# Patient Record
Sex: MALE | Race: WHITE | URBAN - METROPOLITAN AREA
[De-identification: names, ages, dates, MRNs, and addresses within clinical notes are randomized per-mention and may not be internally consistent; named-entity substitution may affect disease eponyms.]

---

## 2017-01-06 ENCOUNTER — OFFICE VISIT (OUTPATIENT)
Dept: FAMILY MEDICINE | Facility: CLINIC | Age: 32
End: 2017-01-06
Payer: COMMERCIAL

## 2017-01-06 VITALS
HEIGHT: 75 IN | HEART RATE: 89 BPM | TEMPERATURE: 98.5 F | BODY MASS INDEX: 23.26 KG/M2 | SYSTOLIC BLOOD PRESSURE: 122 MMHG | DIASTOLIC BLOOD PRESSURE: 71 MMHG | WEIGHT: 187.1 LBS | OXYGEN SATURATION: 98 %

## 2017-01-06 DIAGNOSIS — Z23 NEED FOR PROPHYLACTIC VACCINATION AND INOCULATION AGAINST INFLUENZA: Primary | ICD-10-CM

## 2017-01-06 DIAGNOSIS — F33.0 MAJOR DEPRESSIVE DISORDER, RECURRENT EPISODE, MILD (H): ICD-10-CM

## 2017-01-06 DIAGNOSIS — F51.01 PRIMARY INSOMNIA: ICD-10-CM

## 2017-01-06 PROCEDURE — 90471 IMMUNIZATION ADMIN: CPT | Performed by: FAMILY MEDICINE

## 2017-01-06 PROCEDURE — 90686 IIV4 VACC NO PRSV 0.5 ML IM: CPT | Performed by: FAMILY MEDICINE

## 2017-01-06 PROCEDURE — 99214 OFFICE O/P EST MOD 30 MIN: CPT | Mod: 25 | Performed by: FAMILY MEDICINE

## 2017-01-06 RX ORDER — LORAZEPAM 1 MG/1
0.5 TABLET ORAL 2 TIMES DAILY PRN
Qty: 20 TABLET | Refills: 1 | Status: SHIPPED | OUTPATIENT
Start: 2017-01-06 | End: 2019-02-01

## 2017-01-06 RX ORDER — ESCITALOPRAM OXALATE 10 MG/1
10 TABLET ORAL DAILY
Qty: 30 TABLET | Refills: 3
Start: 2017-01-06 | End: 2017-02-10

## 2017-01-06 RX ORDER — TRAZODONE HYDROCHLORIDE 50 MG/1
50 TABLET, FILM COATED ORAL
Qty: 30 TABLET | Refills: 3 | Status: SHIPPED | OUTPATIENT
Start: 2017-01-06 | End: 2017-05-26

## 2017-01-06 RX ORDER — PROPRANOLOL HYDROCHLORIDE 20 MG/1
20 TABLET ORAL 2 TIMES DAILY
Qty: 60 TABLET | Refills: 1 | Status: SHIPPED | OUTPATIENT
Start: 2017-01-06 | End: 2019-02-01

## 2017-01-06 ASSESSMENT — ANXIETY QUESTIONNAIRES
2. NOT BEING ABLE TO STOP OR CONTROL WORRYING: NEARLY EVERY DAY
GAD7 TOTAL SCORE: 15
7. FEELING AFRAID AS IF SOMETHING AWFUL MIGHT HAPPEN: MORE THAN HALF THE DAYS
IF YOU CHECKED OFF ANY PROBLEMS ON THIS QUESTIONNAIRE, HOW DIFFICULT HAVE THESE PROBLEMS MADE IT FOR YOU TO DO YOUR WORK, TAKE CARE OF THINGS AT HOME, OR GET ALONG WITH OTHER PEOPLE: EXTREMELY DIFFICULT
6. BECOMING EASILY ANNOYED OR IRRITABLE: NEARLY EVERY DAY
5. BEING SO RESTLESS THAT IT IS HARD TO SIT STILL: NOT AT ALL
3. WORRYING TOO MUCH ABOUT DIFFERENT THINGS: NEARLY EVERY DAY
1. FEELING NERVOUS, ANXIOUS, OR ON EDGE: NEARLY EVERY DAY

## 2017-01-06 ASSESSMENT — PATIENT HEALTH QUESTIONNAIRE - PHQ9: 5. POOR APPETITE OR OVEREATING: SEVERAL DAYS

## 2017-01-06 NOTE — PROGRESS NOTES
"  SUBJECTIVE:                                                    Nora Ayala is a 31 year old male who presents to clinic today for the following health issues:      Medication Followup of Trazodone, Lexapro, Inderal    Taking Medication as prescribed: NO-have not been taking    Side Effects:  None    Medication Helping Symptoms:  Have not been taking       Chief Complaint   Patient presents with     Recheck Medication    he's here today with worsening anxiety and depression. He has had this for over a year and has been working on talk therapy and other tools. Has not found them helpful.  PHQ-9 SCORE 7/1/2011 3/19/2012 3/10/2015   Total Score 5 4 23     Today his pH to score is quite high however he has no warning signs. He admits to some in frequent and fleeting thoughts about self-harm but never has any plans or intrusive thoughts or conviction. He has a loving wife and partner who has been helping him through this he really also feels that his therapist has been helpful. Overall he thinks that he is just not been able to manage things on his own. He does have the Lexapro from our last visit a year ago and wonders if he can just start this. No concerns with this and advised that he could do that. He has run out of lorazepam and had been using this sparingly about 1 or 2 times per month for anxiety. And he still has propranolol for presentations. He uses the trazodone sparingly and still has medications left over of this but would like refills.      Problem list and histories reviewed & adjusted, as indicated.  Additional history:     Problem list, Medication list, Allergies, and Medical/Social/Surgical histories reviewed in EPIC and updated as appropriate.    ROS:  Constitutional, HEENT, cardiovascular, pulmonary, gi and gu systems are negative, except as otherwise noted.    OBJECTIVE:                                                    /71 mmHg  Pulse 89  Temp(Src) 98.5  F (36.9  C) (Oral)  Ht 6' 3\" " (1.905 m)  Wt 187 lb 1.6 oz (84.868 kg)  BMI 23.39 kg/m2  SpO2 98%  Body mass index is 23.39 kg/(m^2).  GENERAL APPEARANCE: healthy, alert and no distress  PSYCH: mentation appears normal and affect flat         ASSESSMENT/PLAN:                                                    1. Need for prophylactic vaccination and inoculation against influenza    - FLU VAC, SPLIT VIRUS IM > 3 YO (QUADRIVALENT) [00464]  - Vaccine Administration, Initial [35830]    2. Major depressive disorder, recurrent episode, mild (H)  We had a long discussion about medications how they operate how they help and what to expect from them. Reviewed that a lot of his symptoms should shift from daily symptoms to intermittent symptoms and will have to see if a 10 mg dose or higher is better for him. Would like to see how he's doing in 1 month. Encouraged him to continue with talk therapy and review that a lot of times medicines can help remove barriers to making changes in his therapy might really become more efficient. Discussed in frequent use of Ativan and he has not needed referrals in the last year. And reviewed the use of propranolol for meetings and presentations. If he has any worsening symptoms such as worsening thoughts of self-harm or any intrusive thoughts encouraged him to let us know and we also reviewed the importance of behavioral health at [place] which she is familiar with.  - LORazepam (ATIVAN) 1 MG tablet; Take 0.5 tablets (0.5 mg) by mouth 2 times daily as needed for anxiety (or panic)  Dispense: 20 tablet; Refill: 1  - propranolol (INDERAL) 20 MG tablet; Take 1 tablet (20 mg) by mouth 2 times daily For performance anxiety at work  Dispense: 60 tablet; Refill: 1  - escitalopram (LEXAPRO) 10 MG tablet; Take 1 tablet (10 mg) by mouth daily  Dispense: 30 tablet; Refill: 3    3. Primary insomnia    - traZODone (DESYREL) 50 MG tablet; Take 1 tablet (50 mg) by mouth nightly as needed for sleep  Dispense: 30 tablet; Refill:  3      Follow up with Provider - would like to have him follow-up with a telephone visit in 1 month to see things are going or can be seen in person     Mabel Wiggins MD  [facility]    Injectable Influenza Immunization Documentation    1.  Is the person to be vaccinated sick today?  No    2. Does the person to be vaccinated have an allergy to eggs or to a component of the vaccine?  No    3. Has the person to be vaccinated today ever had a serious reaction to influenza vaccine in the past?  No    4. Has the person to be vaccinated ever had Guillain-Hayward syndrome?  No     Form completed by patient    OMID. TAYLOR Garcia January 6, 2017 4:27 PM

## 2017-01-06 NOTE — NURSING NOTE
"Chief Complaint   Patient presents with     Recheck Medication       Initial /71 mmHg  Pulse 89  Temp(Src) 98.5  F (36.9  C) (Oral)  Ht 6' 3\" (1.905 m)  Wt 187 lb 1.6 oz (84.868 kg)  BMI 23.39 kg/m2  SpO2 98% Estimated body mass index is 23.39 kg/(m^2) as calculated from the following:    Height as of this encounter: 6' 3\" (1.905 m).    Weight as of this encounter: 187 lb 1.6 oz (84.868 kg).  BP completed using cuff size: large(ANGELA)    DENIS Garcia MA January 6, 2017 9:45 AM      "

## 2017-01-07 ASSESSMENT — ANXIETY QUESTIONNAIRES: GAD7 TOTAL SCORE: 15

## 2017-01-07 ASSESSMENT — PATIENT HEALTH QUESTIONNAIRE - PHQ9: SUM OF ALL RESPONSES TO PHQ QUESTIONS 1-9: 25

## 2017-01-10 ENCOUNTER — TELEPHONE (OUTPATIENT)
Dept: FAMILY MEDICINE | Facility: CLINIC | Age: 32
End: 2017-01-10

## 2017-01-10 ENCOUNTER — APPOINTMENT (OUTPATIENT)
Dept: GENERAL RADIOLOGY | Facility: CLINIC | Age: 32
End: 2017-01-10
Attending: EMERGENCY MEDICINE
Payer: COMMERCIAL

## 2017-01-10 ENCOUNTER — HOSPITAL ENCOUNTER (EMERGENCY)
Facility: CLINIC | Age: 32
Discharge: HOME | End: 2017-01-10
Attending: EMERGENCY MEDICINE | Admitting: EMERGENCY MEDICINE
Payer: COMMERCIAL

## 2017-01-10 VITALS
SYSTOLIC BLOOD PRESSURE: 135 MMHG | HEIGHT: 75 IN | DIASTOLIC BLOOD PRESSURE: 80 MMHG | HEART RATE: 61 BPM | WEIGHT: 187 LBS | BODY MASS INDEX: 23.25 KG/M2 | OXYGEN SATURATION: 99 % | TEMPERATURE: 97.9 F

## 2017-01-10 DIAGNOSIS — R13.10 DYSPHAGIA, UNSPECIFIED TYPE: ICD-10-CM

## 2017-01-10 PROCEDURE — 71020 XR CHEST 2 VW: CPT

## 2017-01-10 PROCEDURE — 25000132 ZZH RX MED GY IP 250 OP 250 PS 637: Performed by: EMERGENCY MEDICINE

## 2017-01-10 PROCEDURE — 25000125 ZZHC RX 250: Performed by: EMERGENCY MEDICINE

## 2017-01-10 PROCEDURE — 99283 EMERGENCY DEPT VISIT LOW MDM: CPT | Mod: 25

## 2017-01-10 RX ADMIN — LIDOCAINE HYDROCHLORIDE 30 ML: 20 SOLUTION ORAL; TOPICAL at 11:21

## 2017-01-10 ASSESSMENT — ENCOUNTER SYMPTOMS
VOMITING: 0
TROUBLE SWALLOWING: 1

## 2017-01-10 NOTE — TELEPHONE ENCOUNTER
Spoke with patient.  States he feels like he has a chicken bone stuck in his throat.  When I swallow I can feel it and it hurts.   Denies any problems breathing.    Patient advised to go to ER ([facility] with SN).  Patient verbalizes understanding.  Olesya Banerjee RN

## 2017-01-10 NOTE — ED AVS SNAPSHOT
Emergency Department    [address] [place] [address]    Phone:  [phone]    Fax:  [phone]                                       Nora Ayala   MRN: [id number]    Department:   Emergency Department   Date of Visit:  1/10/2017           After Visit Summary Signature Page     I have received my discharge instructions, and my questions have been answered. I have discussed any challenges I see with this plan with the nurse or doctor.    ..........................................................................................................................................  Patient/Patient Representative Signature      ..........................................................................................................................................  Patient Representative Print Name and Relationship to Patient    ..................................................               ................................................  Date                                            Time    ..........................................................................................................................................  Reviewed by Signature/Title    ...................................................              ..............................................  Date                                                            Time

## 2017-01-10 NOTE — DISCHARGE INSTRUCTIONS
Esophageal Blockage, Resolved  The esophagus is the passage that carries food from the mouth to the stomach. You had a blockage in the esophagus. This can happen after swallowing a large piece of food, taking a large pill, or swallowing foreign objects.  If this is a recurring problem, it can be a sign of disease in the esophagus, such as inflammation (swelling and irritation) or scarring. If you did not have a special procedure (endoscopy) today to treat your condition, further testing will be needed to evaluate this problem.  The blockage has cleared. You should be able to swallow normally again.  Home care    For the next 24 hours you may drink liquids and eat soft foods.    You may have been given medicine today to prevent pain and help you relax. If so, you may feel drowsy for the next 4 to 12 hours. Do not drive or operate dangerous equipment until you feel alert again.    If your esophagus was blocked by food, be sure to cut solid food into small pieces before putting it into your mouth. Chew all foods well before swallowing.    If your esophagus was blocked by an over-the-counter pill (such as a vitamin), avoid this size pill in the future. If it was blocked by a prescription medicine, ask your health care provider for another form of medicine.  Follow-up care  Follow up with your health care provider, or as advised. If you continue to have problems, contact your doctor or this facility for advice. If this is a recurring problem, talk to your health care provider about it. He or she may suggest having an endoscopy. This is a look in the esophagus with a small camera and light in a narrow, flexible tube).  When to seek medical advice  Call your health care provider right away if any of these occur:    Chest pain or shortness of breath    Unable to swallow    Significant pain on swallowing    Vomiting blood (red or black)    Blood in your stool (dark red or black color)    Fever of 100.4 F (38 C) or higher,  or as directed by your health care provider    6297-1777 [employer]. [address] [place] [address], [place] [address]. All rights reserved. This information is not intended as a substitute for professional medical care. Always follow your healthcare professional's instructions.

## 2017-01-10 NOTE — ED PROVIDER NOTES
"  History     Chief Complaint:  Dysphagia      HPI   Nora Ayala is a 31 year old male with a history of anxiety who presents with concerns for dysphagia. The patient reports eating chicken for dinner last night and did note swallowing a hard piece of chicken. After dinner the patient noticed a mid sternal discomfort with swallowing. The patient woke this morning with ongoing discomfort. He is able to handle his secretions but water \"catches a little bit.\" He has not tried eating any solid food. He was referred here by his PCP. The patient does not report any vomiting other symptoms.     Allergies:  Amoxicillin  Ciclopirox       Medications:  LORazepam (ATIVAN) 1 MG tablet  traZODone (DESYREL) 50 MG tablet  propranolol (INDERAL) 20 MG tablet  escitalopram (LEXAPRO) 10 MG tablet    Past Medical History:    Intermittent asthma  Anxiety   Panic attacks    Mild major depression   External hemorrhoids     Past Surgical History:    No past surgical history on file.    Family History:    Mother - depression, thyroid disease, cancer   Father - migraines  Grandmother - liver cancer     Social History:  Marital Status:  Single   Smoking status: Never smoker, passive smoke exposure   Alcohol use: Yes, socially       Review of Systems   HENT: Positive for trouble swallowing.    Gastrointestinal: Negative for vomiting.   All other systems reviewed and are negative.      Physical Exam     Patient Vitals for the past 24 hrs:   BP Temp Temp src Pulse SpO2 Height Weight   01/10/17 1236 135/80 mmHg - - 61 99 % - -   01/10/17 1113 146/81 mmHg 97.9  F (36.6  C) Oral 58 97 % 1.905 m (6' 3\") 84.823 kg (187 lb)           Physical Exam  Constitutional: The patient is oriented to person, place, and time.   HENT:   Head: Atraumatic  Right Ear: Normal  Left Ear: Normal  Nose: Nose normal.   Mouth/Throat: Oropharynx is clear and moist. No erythema or exudate.   Eyes: Conjunctivae and EOM are normal. Pupils are equal, round, and reactive to " light. No discharge  Neck: Normal range of motion. Neck supple.   Cardiovascular: Normal rate, regular rhythm, no murmur gallops or rubs. Intact distal pulses.    Pulmonary/Chest: CTA bilaterally. No wheezes rale or rhonchi.  Abdominal: Soft. Non tender.  No masses   Musculoskeletal: No edema. No bony deformity. Normal range of motion  Lymphadenopathy:     The patient has no cervical adenopathy.   Neurological: The patient is alert and oriented to person, place, and time. The patient has normal strength and normal reflexes. No cranial nerve deficit. Coordination normal.   Skin: Skin is warm and dry. No rash noted. The patient is not diaphoretic.   Psychiatric: The patient has a normal mood and affect.    Emergency Department Course     Imaging:  Radiographic findings were communicated with the patient who voiced understanding of the findings.    XR Chest PA & LAT  IMPRESSION:  No visualized chicken bones. Lungs are clear. No acute  process identified.   Preliminary radiology read.       Interventions:  (1121) GI Cocktail (Maalox/Mylanta and viscous Lidocaine), 15 mL suspension, PO      Emergency Department Course:  Nursing notes and vitals reviewed.  (1112) I performed an exam of the patient as documented above.    The patient was sent for a chest xray while in the emergency department, findings above.      (1230) Patient update. Findings and plan explained to the patient. Patient discharged home with instructions regarding supportive care, medications, and reasons to return. The importance of close follow-up was reviewed.     Impression & Plan      Medical Decision Making:  Patient presents with pain with swallowing and thinks he may have a chicken bone stuck lower in his esophagus, he estimates in the mid sternal region. He is having no evidence for respiratory compromise. He is able to manage his own secretions. Chest xray does not show obvious bony foreign body. Given this I feel he can be safely discharged to  home. This may be simple esophogeal abrasion. I have recommended liquid antacid. He can try eating some soft bread to try to move anything down. If he has persistent symptoms he may require GI follow up for upper endoscopy.       Diagnosis:    ICD-10-CM   1. Dysphagia, unspecified type R13.10       Disposition:  Patient is discharged to home.              Garth Brown  1/10/2017    EMERGENCY DEPARTMENT    IGarth, am serving as a scribe on 1/10/2017 at 11:12 AM to personally document services performed by Dr. Avila based on my observations and the provider's statements to me.      Juni Avila MD  01/10/17 7435

## 2017-01-10 NOTE — TELEPHONE ENCOUNTER
Reason for Call:  Other call back    Detailed comments:  Sonia saldaña call and stated that they went out to dinner  Last leo and her  thinks that he has a chicken done stuck in his throat.   He is able to breath. Just wants to know if they should come in or will it work it's way down    Phone Number Patient can be reached at: Home number on file [phone] (home)    Best Time: anytime    Can we leave a detailed message on this number? YES    Call taken on 1/10/2017 at 9:23 AM by Celia Maxwell

## 2017-01-10 NOTE — ED AVS SNAPSHOT
Emergency Department    [address] [facility] [address]    Phone:  [phone]    Fax:  [phone]                                       Nora Ayala   MRN: [id number]    Department:   Emergency Department   Date of Visit:  1/10/2017           Patient Information     Date Of Birth          1985        Your diagnoses for this visit were:     Dysphagia, unspecified type        You were seen by Juni Avila MD.      Follow-up Information     Follow up with Mabel Wiggins MD.    Specialty:  Family Practice    Contact information:    [facility]  [address]  [facility] [address] [phone]          Follow up with Maci Self MD.    Specialty:  Gastroenterology    Why:  As needed    Contact information:    [facility]  [address] [place] [address] [phone]          Discharge Instructions         Esophageal Blockage, Resolved  The esophagus is the passage that carries food from the mouth to the stomach. You had a blockage in the esophagus. This can happen after swallowing a large piece of food, taking a large pill, or swallowing foreign objects.  If this is a recurring problem, it can be a sign of disease in the esophagus, such as inflammation (swelling and irritation) or scarring. If you did not have a special procedure (endoscopy) today to treat your condition, further testing will be needed to evaluate this problem.  The blockage has cleared. You should be able to swallow normally again.  Home care    For the next 24 hours you may drink liquids and eat soft foods.    You may have been given medicine today to prevent pain and help you relax. If so, you may feel drowsy for the next 4 to 12 hours. Do not drive or operate dangerous equipment until you feel alert again.    If your esophagus was blocked by food, be sure to cut solid food into small pieces before putting it into your mouth. Chew all foods well before  swallowing.    If your esophagus was blocked by an over-the-counter pill (such as a vitamin), avoid this size pill in the future. If it was blocked by a prescription medicine, ask your health care provider for another form of medicine.  Follow-up care  Follow up with your health care provider, or as advised. If you continue to have problems, contact your doctor or this facility for advice. If this is a recurring problem, talk to your health care provider about it. He or she may suggest having an endoscopy. This is a look in the esophagus with a small camera and light in a narrow, flexible tube).  When to seek medical advice  Call your health care provider right away if any of these occur:    Chest pain or shortness of breath    Unable to swallow    Significant pain on swallowing    Vomiting blood (red or black)    Blood in your stool (dark red or black color)    Fever of 100.4 F (38 C) or higher, or as directed by your health care provider    4579-1891 [employer]. [address] [place] [address]. All rights reserved. This information is not intended as a substitute for professional medical care. Always follow your healthcare professional's instructions.          Future Appointments        Provider Department Dept Phone Center    1/13/2017 3:30 PM Mabel Wiggins MD [facility] [phone] UP      24 Hour Appointment Hotline       To make an appointment at any [facility], call [phone] ([phone]). If you don't have a family doctor or clinic, we will help you find one. [facility] are conveniently located to serve the needs of you and your family.             Review of your medicines      Our records show that you are taking the medicines listed below. If these are incorrect, please call your family doctor or clinic.        Dose / Directions Last dose taken    BOOSTRIX 5-2.5-18.5 LF-MCG/0.5 injection   Generic drug:  Tdap (tetanus-diptheria-acell  pertussis)        ADM 0.5ML IM UTD   Refills:  0        escitalopram 10 MG tablet   Commonly known as:  LEXAPRO   Dose:  10 mg   Quantity:  30 tablet        Take 1 tablet (10 mg) by mouth daily   Refills:  3        LORazepam 1 MG tablet   Commonly known as:  ATIVAN   Dose:  0.5 mg   Quantity:  20 tablet        Take 0.5 tablets (0.5 mg) by mouth 2 times daily as needed for anxiety (or panic)   Refills:  1        propranolol 20 MG tablet   Commonly known as:  INDERAL   Dose:  20 mg   Quantity:  60 tablet        Take 1 tablet (20 mg) by mouth 2 times daily For performance anxiety at work   Refills:  1        traZODone 50 MG tablet   Commonly known as:  DESYREL   Dose:  50 mg   Quantity:  30 tablet        Take 1 tablet (50 mg) by mouth nightly as needed for sleep   Refills:  3                Procedures and tests performed during your visit     Chest XR,  PA & LAT      Orders Needing Specimen Collection     None      Pending Results     No orders found from 1/9/2017 to 1/11/2017.            Pending Culture Results     No orders found from 1/9/2017 to 1/11/2017.       Test Results from your hospital stay           1/10/2017 12:30 PM - Interface, Radiant Ib      Narrative     CHEST TWO VIEWS  1/10/2017 12:22 PM    HISTORY:  Question chicken bone in esophagus.    COMPARISON:  None.        Impression     IMPRESSION:  No visualized chicken bones. Lungs are clear. No acute  process identified.     SHEYLA ALVARADO MD                Clinical Quality Measure: Blood Pressure Screening     Your blood pressure was checked while you were in the emergency department today. The last reading we obtained was  BP: 146/81 mmHg . Please read the guidelines below about what these numbers mean and what you should do about them.  If your systolic blood pressure (the top number) is less than 120 and your diastolic blood pressure (the bottom number) is less than 80, then your blood pressure is normal. There is nothing more that you need to do  "about it.  If your systolic blood pressure (the top number) is 120-139 or your diastolic blood pressure (the bottom number) is 80-89, your blood pressure may be higher than it should be. You should have your blood pressure rechecked within a year by a primary care provider.  If your systolic blood pressure (the top number) is 140 or greater or your diastolic blood pressure (the bottom number) is 90 or greater, you may have high blood pressure. High blood pressure is treatable, but if left untreated over time it can put you at risk for heart attack, stroke, or kidney failure. You should have your blood pressure rechecked by a primary care provider within the next 4 weeks.  If your provider in the emergency department today gave you specific instructions to follow-up with your doctor or provider even sooner than that, you should follow that instruction and not wait for up to 4 weeks for your follow-up visit.        Thank you for choosing [facility]       Thank you for choosing [facility] for your care. Our goal is always to provide you with excellent care. Hearing back from our patients is one way we can continue to improve our services. Please take a few minutes to complete the written survey that you may receive in the mail after you visit with us. Thank you!        [email or web address] Information     [employer] lets you send messages to your doctor, view your test results, renew your prescriptions, schedule appointments and more. To sign up, go to [email or web address] . Click on \"Log in\" on the left side of the screen, which will take you to the Welcome page. Then click on \"Sign up Now\" on the right side of the page.     You will be asked to enter the access code listed below, as well as some personal information. Please follow the directions to create your username and password.     Your access code is: [email or web address]  Expires: 4/10/2017 11:35 AM     Your access code will  in 90 days. If you need help or a new code, please " call your [facility] clinic or [phone].        Care EveryWhere ID     This is your Care EveryWhere ID. This could be used by other organizations to access your [facility] medical records  [id number]        After Visit Summary       This is your record. Keep this with you and show to your community pharmacist(s) and doctor(s) at your next visit.

## 2017-01-11 ENCOUNTER — TELEPHONE (OUTPATIENT)
Dept: FAMILY MEDICINE | Facility: CLINIC | Age: 32
End: 2017-01-11

## 2017-01-11 NOTE — TELEPHONE ENCOUNTER
Patient states he was not aware he had ER f/u appt for 1/13.  States he doesn't feel he needs to f/u.    Reviewed d/c summary with pt:    Impression & Plan       Medical Decision Making:  Patient presents with pain with swallowing and thinks he may have a chicken bone stuck lower in his esophagus, he estimates in the mid sternal region. He is having no evidence for respiratory compromise. He is able to manage his own secretions. Chest xray does not show obvious bony foreign body. Given this I feel he can be safely discharged to home. This may be simple esophogeal abrasion. I have recommended liquid antacid. He can try eating some soft bread to try to move anything down. If he has persistent symptoms he may require GI follow up for upper endoscopy.       Diagnosis:      ICD-10-CM    1.  Dysphagia, unspecified type  R13.10        Disposition:  Patient is discharged to home.            He will continue to monitor and f/u if needed.  Cancelled appt  Ibis KING RN

## 2017-01-11 NOTE — TELEPHONE ENCOUNTER
Reason for Call:  Other call back    Detailed comments: pt did not make the appt for Friday the 13th. And  Does not know why they have to come. Pt would like a call back for  More information abaout it.    Phone Number Patient can be reached at: Home number on file [phone] (home)    Best Time: anyitme    Can we leave a detailed message on this number? YES    Call taken on 1/11/2017 at 1:25 PM by Hilary Beckett

## 2017-02-10 ENCOUNTER — TELEPHONE (OUTPATIENT)
Dept: FAMILY MEDICINE | Facility: CLINIC | Age: 32
End: 2017-02-10

## 2017-02-10 DIAGNOSIS — F33.0 MAJOR DEPRESSIVE DISORDER, RECURRENT EPISODE, MILD (H): Primary | ICD-10-CM

## 2017-02-10 RX ORDER — ESCITALOPRAM OXALATE 10 MG/1
10 TABLET ORAL DAILY
Qty: 30 TABLET | Refills: 0 | Status: SHIPPED | OUTPATIENT
Start: 2017-02-10 | End: 2017-02-17

## 2017-02-10 NOTE — TELEPHONE ENCOUNTER
Pt saw SN 1/6/2017, noted:  Major depressive disorder, recurrent episode, mild (H)  Reviewed that a lot of his symptoms should shift from daily symptoms to   intermittent symptoms and will have to see if a 10 mg dose or higher is better for him. Would   like to see how he's doing in 1 month. Encouraged him to continue with talk therapy and   review that a lot of times medicines can help remove barriers to making changes in his therapy   might really become more efficient.     Patient states he has been taking 10mg daily for the past month; has been working well.  Last Rx sent 9/30/2015 #30 with 3 refills; has been on and off med per patient, but SN is aware of this and just restarted med at 1/6/2017 appt  Due for f/u; scheduled with MATTHEW.  Medication is being filled for 1 time refill only due to:  upcoming appt   Patient informed Rx sent.  Ibis KING RN

## 2017-02-10 NOTE — TELEPHONE ENCOUNTER
Reason for Call:  Medication or medication refill:    Do you use a [facility]?  Name of the pharmacy and phone number for the current request:       [facility] [address] [facility] [address] AT [facility] [address]      Name of the medication requested: escitalopram (LEXAPRO) 10 MG tablet    Other request:     Can we leave a detailed message on this number? YES    Phone number patient can be reached at: Home number on file [phone] (home)    Best Time:     Call taken on 2/10/2017 at 10:11 AM by Mary Mcgrath

## 2017-02-17 ENCOUNTER — OFFICE VISIT (OUTPATIENT)
Dept: FAMILY MEDICINE | Facility: CLINIC | Age: 32
End: 2017-02-17
Payer: COMMERCIAL

## 2017-02-17 VITALS
BODY MASS INDEX: 22.5 KG/M2 | WEIGHT: 181 LBS | OXYGEN SATURATION: 97 % | TEMPERATURE: 98.2 F | RESPIRATION RATE: 16 BRPM | DIASTOLIC BLOOD PRESSURE: 80 MMHG | HEIGHT: 75 IN | SYSTOLIC BLOOD PRESSURE: 120 MMHG | HEART RATE: 83 BPM

## 2017-02-17 DIAGNOSIS — G47.09 OTHER INSOMNIA: ICD-10-CM

## 2017-02-17 DIAGNOSIS — F33.0 MAJOR DEPRESSIVE DISORDER, RECURRENT EPISODE, MILD (H): Primary | ICD-10-CM

## 2017-02-17 PROCEDURE — 99213 OFFICE O/P EST LOW 20 MIN: CPT | Performed by: FAMILY MEDICINE

## 2017-02-17 RX ORDER — ESCITALOPRAM OXALATE 20 MG/1
20 TABLET ORAL DAILY
Qty: 30 TABLET | Refills: 6 | Status: SHIPPED | OUTPATIENT
Start: 2017-02-17 | End: 2017-09-29

## 2017-02-17 NOTE — PROGRESS NOTES
SUBJECTIVE:                                                    Nora Ayala is a 30 year old male who presents to clinic today for the following health issues:    Abnormal Mood Symptoms      Duration: On going issue roughly 2 years    Description:  Depression: YES  Anxiety: YES  Panic attacks: YES  4-5    Accompanying signs and symptoms: see PHQ-9 and NADIA scores    History (similar episodes/previous evaluation):  Patients mother passed away    Precipitating or alleviating factors: None    Therapies tried and outcome: Celexa (Citalopram)  Not effective. exercising short acting relief, and reading      Panic attacks due to work  Difficulties with self esteem at work  Was seeing a therapist in the past insurance did not cover - too expensive  He is now on changing jobs which he thinks will be helpful    Recently restarted Lexapro 10mg     lorazepam , hasn't been using very much    Taking trazodone, pretty much every night for sleep    Problem list and histories reviewed & adjusted, as indicated.  Additional history: as documented    Patient Active Problem List   Diagnosis     CARDIOVASCULAR SCREENING; LDL GOAL LESS THAN 160     Mild major depression (H)     Insomnia     Family history of cancer of digestive organ     External hemorrhoids     Panic attack     No past surgical history on file.    Social History   Substance Use Topics     Smoking status: Passive Smoke Exposure - Never Smoker     Smokeless tobacco: Never Used     Alcohol use 0.0 oz/week     0 Standard drinks or equivalent per week      Comment: occa- social      Family History   Problem Relation Age of Onset     Depression Mother      Gynecology Mother      Neurologic Disorder Father      MIGRAINE     Thyroid Disease Mother      CANCER Maternal Grandmother      LIVER CANCER     Allergies       SELF- CICLOR, AMOXICILLIN     Depression       SELF     Eye Disorder       SELF     Neurologic Disorder       SELF     Respiratory       SELF     CANCER Mother 59  "        Current Outpatient Prescriptions   Medication Sig Dispense Refill     escitalopram (LEXAPRO) 10 MG tablet Take 1 tablet (10 mg) by mouth daily 30 tablet 0     BOOSTRIX 5-2.5-18.5 injection ADM 0.5ML IM UTD  0     LORazepam (ATIVAN) 1 MG tablet Take 0.5 tablets (0.5 mg) by mouth 2 times daily as needed for anxiety (or panic) 20 tablet 1     traZODone (DESYREL) 50 MG tablet Take 1 tablet (50 mg) by mouth nightly as needed for sleep 30 tablet 3     propranolol (INDERAL) 20 MG tablet Take 1 tablet (20 mg) by mouth 2 times daily For performance anxiety at work 60 tablet 1     Labs reviewed in EPIC  Problem list, Medication list, Allergies, and Medical/Social/Surgical histories reviewed in [facility] and updated as appropriate.    ROS:  Constitutional, HEENT, cardiovascular, pulmonary, gi and gu systems are negative, except as otherwise noted.    OBJECTIVE:                                                    /80 (BP Location: Left arm, Cuff Size: Adult Regular)  Pulse 83  Temp 98.2  F (36.8  C) (Oral)  Resp 16  Ht 6' 3\" (1.905 m)  Wt 181 lb (82.1 kg)  SpO2 97%  BMI 22.62 kg/m2  Body mass index is 22.62 kg/(m^2).  GENERAL APPEARANCE: healthy, alert and no distress  PSYCH: mentation appears normal, patient appearance--good eye contact       ASSESSMENT/PLAN:                                                        ICD-10-CM    1. Major depressive disorder, single episode, mild (H) F32.0    2. Other insomnia G47.09    3. Major depressive disorder, recurrent episode, mild (H) F33.0 escitalopram (LEXAPRO) 20 MG tablet     Discussed potential for impairment and dependence with benzo medications  Patient is using appropriately  Increase citalopram to 20 mg daily    Follow-up in 4-6 weeks or as needed  Lorenzo Adhikari      [facility]          "

## 2017-02-17 NOTE — MR AVS SNAPSHOT
"              After Visit Summary   2/17/2017    Nora Ayala    MRN: [id number]           Patient Information     Date Of Birth          1985        Visit Information        Provider Department      2/17/2017 11:30 AM Lorenzo Adhikari MD [facility]        Today's Diagnoses     Major depressive disorder, recurrent episode, mild (H)    -  1    Other insomnia           Follow-ups after your visit        Follow-up notes from your care team     Return in about 2 months (around 4/17/2017).      Who to contact     If you have questions or need follow up information about today's clinic visit or your schedule please contact [facility] directly at [phone].  Normal or non-critical lab and imaging results will be communicated to you by MyChart, letter or phone within 4 business days after the clinic has received the results. If you do not hear from us within 7 days, please contact the clinic through Ruzukuhart or phone. If you have a critical or abnormal lab result, we will notify you by phone as soon as possible.  Submit refill requests through [employer]Vista or call your pharmacy and they will forward the refill request to us. Please allow 3 business days for your refill to be completed.          Additional Information About Your Visit        MyChart Information     OpVista gives you secure access to your electronic health record. If you see a primary care provider, you can also send messages to your care team and make appointments. If you have questions, please call your primary care clinic.  If you do not have a primary care provider, please call [phone] and they will assist you.        Care EveryWhere ID     This is your Care EveryWhere ID. This could be used by other organizations to access your [facility] medical records  [id number]        Your Vitals Were     Pulse Temperature Respirations Height Pulse Oximetry BMI (Body Mass Index)    83 98.2  F (36.8  C) (Oral) 16 6' 3\" (1.905 m) " 97% 22.62 kg/m2       Blood Pressure from Last 3 Encounters:   02/17/17 120/80   01/10/17 135/80   01/06/17 122/71    Weight from Last 3 Encounters:   02/17/17 181 lb (82.1 kg)   01/10/17 187 lb (84.8 kg)   01/06/17 187 lb 1.6 oz (84.9 kg)              Today, you had the following     No orders found for display         Today's Medication Changes          These changes are accurate as of: 2/17/17  1:45 PM.  If you have any questions, ask your nurse or doctor.               These medicines have changed or have updated prescriptions.        Dose/Directions    escitalopram 20 MG tablet   Commonly known as:  LEXAPRO   This may have changed:    - medication strength  - how much to take   Used for:  Major depressive disorder, recurrent episode, mild (H)   Changed by:  Lorenzo Adhikari MD        Dose:  20 mg   Take 1 tablet (20 mg) by mouth daily   Quantity:  30 tablet   Refills:  6            Where to get your medicines      These medications were sent to [facility] [address] [place] [address] [address] AT [facility] [address] [facility] [address]     Phone:  [phone]     escitalopram 20 MG tablet                Primary Care Provider Office Phone # Fax #    W[place]Monserrat Wiggins [phone]       [facility] [address] [address]        Thank you!     Thank you for choosing [facility]  for your care. Our goal is always to provide you with excellent care. Hearing back from our patients is one way we can continue to improve our services. Please take a few minutes to complete the written survey that you may receive in the mail after your visit with us. Thank you!             Your Updated Medication List - Protect others around you: Learn how to safely use, store and throw away your medicines at www.disposemymeds.org.          This list is accurate as of: 2/17/17  1:45 PM.  Always use your most recent med  list.                   Brand Name Dispense Instructions for use    BOOSTRIX 5-2.5-18.5 LF-MCG/0.5 injection   Generic drug:  Tdap (tetanus-diptheria-acell pertussis)      ADM 0.5ML IM UTD       escitalopram 20 MG tablet    LEXAPRO    30 tablet    Take 1 tablet (20 mg) by mouth daily       LORazepam 1 MG tablet    ATIVAN    20 tablet    Take 0.5 tablets (0.5 mg) by mouth 2 times daily as needed for anxiety (or panic)       propranolol 20 MG tablet    INDERAL    60 tablet    Take 1 tablet (20 mg) by mouth 2 times daily For performance anxiety at work       traZODone 50 MG tablet    DESYREL    30 tablet    Take 1 tablet (50 mg) by mouth nightly as needed for sleep

## 2017-02-17 NOTE — NURSING NOTE
"Chief Complaint   Patient presents with     Depression     initial /80 (BP Location: Left arm, Cuff Size: Adult Regular)  Pulse 83  Temp 98.2  F (36.8  C) (Oral)  Resp 16  Ht 6' 3\" (1.905 m)  Wt 181 lb (82.1 kg)  SpO2 97%  BMI 22.62 kg/m2 Estimated body mass index is 22.62 kg/(m^2) as calculated from the following:    Height as of this encounter: 6' 3\" (1.905 m).    Weight as of this encounter: 181 lb (82.1 kg).  BP completed using cuff size: regular.  L  arm      Health Maintenance that is potentially due pending provider review:  NONE    n/a    Emmett Milian ma  "

## 2017-02-18 ASSESSMENT — PATIENT HEALTH QUESTIONNAIRE - PHQ9: SUM OF ALL RESPONSES TO PHQ QUESTIONS 1-9: 16

## 2017-05-26 DIAGNOSIS — F51.01 PRIMARY INSOMNIA: ICD-10-CM

## 2017-05-26 RX ORDER — TRAZODONE HYDROCHLORIDE 50 MG/1
50 TABLET, FILM COATED ORAL
Qty: 30 TABLET | Refills: 0 | Status: SHIPPED | OUTPATIENT
Start: 2017-05-26 | End: 2017-06-14

## 2017-05-26 NOTE — TELEPHONE ENCOUNTER
Medication is being filled for 1 time refill only due to:  overdue for f/u appt   Patient saw JF 2/17/2017, noted to f/u in 4-6 weeks  Ibis KING RN

## 2017-05-26 NOTE — TELEPHONE ENCOUNTER
Pending Prescriptions:                       Disp   Refills    traZODone (DESYREL) 50 MG tablet          30 tab*3            Sig: Take 1 tablet (50 mg) by mouth nightly as needed           for sleep          Last Written Prescription Date: 0/06/2017  Last Fill Quantity: 30,  # refills: 3   Last Office Visit with [facility]RISTIANO, [facility] or [facility] prescribing provider: 02/17/2017

## 2017-06-14 ENCOUNTER — OFFICE VISIT (OUTPATIENT)
Dept: FAMILY MEDICINE | Facility: CLINIC | Age: 32
End: 2017-06-14
Payer: COMMERCIAL

## 2017-06-14 VITALS
TEMPERATURE: 98.3 F | BODY MASS INDEX: 22.88 KG/M2 | RESPIRATION RATE: 14 BRPM | OXYGEN SATURATION: 96 % | DIASTOLIC BLOOD PRESSURE: 74 MMHG | WEIGHT: 184 LBS | HEART RATE: 64 BPM | SYSTOLIC BLOOD PRESSURE: 110 MMHG | HEIGHT: 75 IN

## 2017-06-14 DIAGNOSIS — Z23 NEED FOR VACCINATION: ICD-10-CM

## 2017-06-14 DIAGNOSIS — F10.10 DYSFUNCTIONAL ALCOHOL USE: ICD-10-CM

## 2017-06-14 DIAGNOSIS — R13.10 DYSPHAGIA, UNSPECIFIED TYPE: ICD-10-CM

## 2017-06-14 DIAGNOSIS — F51.01 PRIMARY INSOMNIA: ICD-10-CM

## 2017-06-14 DIAGNOSIS — Z00.00 ROUTINE GENERAL MEDICAL EXAMINATION AT A HEALTH CARE FACILITY: Primary | ICD-10-CM

## 2017-06-14 DIAGNOSIS — F33.1 MODERATE RECURRENT MAJOR DEPRESSION (H): ICD-10-CM

## 2017-06-14 DIAGNOSIS — D22.9 NUMEROUS MOLES: ICD-10-CM

## 2017-06-14 PROCEDURE — 99213 OFFICE O/P EST LOW 20 MIN: CPT | Mod: 25 | Performed by: FAMILY MEDICINE

## 2017-06-14 PROCEDURE — 90471 IMMUNIZATION ADMIN: CPT | Performed by: FAMILY MEDICINE

## 2017-06-14 PROCEDURE — 80053 COMPREHEN METABOLIC PANEL: CPT | Performed by: FAMILY MEDICINE

## 2017-06-14 PROCEDURE — 36415 COLL VENOUS BLD VENIPUNCTURE: CPT | Performed by: FAMILY MEDICINE

## 2017-06-14 PROCEDURE — 99395 PREV VISIT EST AGE 18-39: CPT | Mod: 25 | Performed by: FAMILY MEDICINE

## 2017-06-14 PROCEDURE — 82977 ASSAY OF GGT: CPT | Performed by: FAMILY MEDICINE

## 2017-06-14 PROCEDURE — 90715 TDAP VACCINE 7 YRS/> IM: CPT | Performed by: FAMILY MEDICINE

## 2017-06-14 RX ORDER — TRAZODONE HYDROCHLORIDE 50 MG/1
50 TABLET, FILM COATED ORAL
Qty: 30 TABLET | Refills: 11 | Status: SHIPPED | OUTPATIENT
Start: 2017-06-14 | End: 2018-06-11

## 2017-06-14 NOTE — MR AVS SNAPSHOT
After Visit Summary   6/14/2017    Nora Ayala    MRN: [id number]           Patient Information     Date Of Birth          1985        Visit Information        Provider Department      6/14/2017 1:00 PM Farhana Maznanares MD [facility]        Today's Diagnoses     Routine general medical examination at a health care facility    -  1    Moderate recurrent major depression (H)        Primary insomnia        Dysfunctional alcohol use        Dysphagia, unspecified type        Numerous moles        Need for vaccination          Care Instructions      Preventive Health Recommendations  Male Ages 26 - 39    Yearly exam:             See your health care provider every year in order to  o   Review health changes.   o   Discuss preventive care.    o   Review your medicines if your doctor has prescribed any.    You should be tested each year for STDs (sexually transmitted diseases), if you re at risk.     After age 35, talk to your provider about cholesterol testing. If you are at risk for heart disease, have your cholesterol tested at least every 5 years.     If you are at risk for diabetes, you should have a diabetes test (fasting glucose).  Shots: Get a flu shot each year. Get a tetanus shot every 10 years.     Nutrition:    Eat at least 5 servings of fruits and vegetables daily.     Eat whole-grain bread, whole-wheat pasta and brown rice instead of white grains and rice.     Talk to your provider about Calcium and Vitamin D.     Lifestyle    Exercise for at least 150 minutes a week (30 minutes a day, 5 days a week). This will help you control your weight and prevent disease.     Limit alcohol to one drink per day.     No smoking.     Wear sunscreen to prevent skin cancer.     See your dentist every six months for an exam and cleaning.             Follow-ups after your visit        Additional Services     GASTROENTEROLOGY ADULT REF CONSULT ONLY       Preferred Location: [facility], [facility]  [phone], [facility], P: [phone] and [facility] [phone]      Please be aware that coverage of these services is subject to the terms and limitations of your health insurance plan.  Call member services at your health plan with any benefit or coverage questions.  Any procedures must be performed at a [facility] facility OR coordinated by your clinic's referral office.    Please bring the following with you to your appointment:    (1) Any X-Rays, CTs or MRIs which have been performed.  Contact the facility where they were done to arrange for  prior to your scheduled appointment.    (2) List of current medications   (3) This referral request   (4) Any documents/labs given to you for this referral                  Who to contact     If you have questions or need follow up information about today's clinic visit or your schedule please contact [facility] directly at [phone].  Normal or non-critical lab and imaging results will be communicated to you by NewComLinkhart, letter or phone within 4 business days after the clinic has received the results. If you do not hear from us within 7 days, please contact the clinic through NewComLinkhart or phone. If you have a critical or abnormal lab result, we will notify you by phone as soon as possible.  Submit refill requests through [employer] or call your pharmacy and they will forward the refill request to us. Please allow 3 business days for your refill to be completed.          Additional Information About Your Visit        [place] Information     [employer] gives you secure access to your electronic health record. If you see a primary care provider, you can also send messages to your care team and make appointments. If you have questions, please call your primary care clinic.  If you do not have a primary care provider, please call [phone] and they will assist you.        Care EveryWhere ID     This is your Care EveryWhere ID. This could be  "used by other organizations to access your [facility] medical records  [id number]        Your Vitals Were     Pulse Temperature Respirations Height Pulse Oximetry BMI (Body Mass Index)    64 98.3  F (36.8  C) (Oral) 14 6' 3\" (1.905 m) 96% 23 kg/m2       Blood Pressure from Last 3 Encounters:   06/14/17 110/74   02/17/17 120/80   01/10/17 135/80    Weight from Last 3 Encounters:   06/14/17 184 lb (83.5 kg)   02/17/17 181 lb (82.1 kg)   01/10/17 187 lb (84.8 kg)              We Performed the Following     Comprehensive metabolic panel     GASTROENTEROLOGY ADULT REF CONSULT ONLY     GGT     TDAP VACCINE (ADACEL)          Where to get your medicines      These medications were sent to [facility] [address] [place], [place] [address] AT [address] [address]    Hours:  24-hours Phone:  [phone]     traZODone 50 MG tablet          Primary Care Provider Office Phone # Fax #    MarionMonserrat Wiggins [phone]       [address] [address]        Thank you!     Thank you for choosing [facility]  for your care. Our goal is always to provide you with excellent care. Hearing back from our patients is one way we can continue to improve our services. Please take a few minutes to complete the written survey that you may receive in the mail after your visit with us. Thank you!             Your Updated Medication List - Protect others around you: Learn how to safely use, store and throw away your medicines at www.disposemymeds.org.          This list is accurate as of: 6/14/17  2:01 PM.  Always use your most recent med list.                   Brand Name Dispense Instructions for use    escitalopram 20 MG tablet    LEXAPRO    30 tablet    Take 1 tablet (20 mg) by mouth daily       LORazepam 1 MG tablet    ATIVAN    20 tablet    Take 0.5 tablets (0.5 mg) by mouth 2 times daily as needed " for anxiety (or panic)       propranolol 20 MG tablet    INDERAL    60 tablet    Take 1 tablet (20 mg) by mouth 2 times daily For performance anxiety at work       traZODone 50 MG tablet    DESYREL    30 tablet    Take 1 tablet (50 mg) by mouth nightly as needed for sleep

## 2017-06-14 NOTE — PROGRESS NOTES
SUBJECTIVE:     CC: Nora Ayala is an 31 year old male who presents for preventative health visit.     Healthy Habits:    Do you get at least three servings of calcium containing foods daily (dairy, green leafy vegetables, etc.)? yes    Amount of exercise or daily activities, outside of work: 3 day(s) per week    Problems taking medications regularly No    Medication side effects: Yes insomnia from lexapro-feels SOB within last yr-corresponds to starting medication    Have you had an eye exam in the past two years? no    Do you see a dentist twice per year? 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   Feeling down depressed often, feeling tired , restless, poor concentration  Poor sleep started since on lexapro  Trazodone help but does not want to rely on it for very long  Does not want medications changes for 2 months, till new job ok for a bit  Feels some improvement in mental health have occurred from  Life style changes, drinking alcohol less- average 20 drinks a week  PHQ-9 SCORE 2017   Total Score - - -   Total Score 25 16 15       Difficult swallowing food and sometimes water as well  Pills stick in the back of the throat  Carrot was stuck and had choking incident and lodged, was able to breath swallow, tried to swallow water and it came back and vomited 3 times, before was able to swallow water and it went down- this happened 2 weeks ago  He reports he was chewing well, was not in a hurry and now the food still sticks in the back of the thraot  Has good appetite, no weight loss  Mom just passed on at age 49 from  pancreatic cancer, and maternal  of cancer, not sure which one     PROBLEMS  "TO ADD ON...    Today's PHQ-2 Score:   PHQ-2 ( 1999 Pfizer) 6/14/2017 1/6/2017   Q1: Little interest or pleasure in doing things 0 3   Q2: Feeling down, depressed or hopeless 2 3   PHQ-2 Score 2 6       Abuse: Current or Past(Physical, Sexual or Emotional)- No  Do you feel safe in your environment - Yes    Social History   Substance Use Topics     Smoking status: Passive Smoke Exposure - Never Smoker     Smokeless tobacco: Never Used     Alcohol use 0.0 oz/week     0 Standard drinks or equivalent per week      Comment: occa- social      The patient does not drink >3 drinks per day nor >7 drinks per week.    Last PSA: No results found for: PSA    No results for input(s): CHOL, HDL, LDL, TRIG, CHOLHDLRATIO, NHDL in the last 55161 hours.    Reviewed orders with patient. Reviewed health maintenance and updated orders accordingly - Yes    Reviewed and updated as needed this visit by clinical staff  Tobacco  Allergies  Meds  Med Hx  Surg Hx  Fam Hx  Soc Hx        Reviewed and updated as needed this visit by Provider            ROS:  C: NEGATIVE for fever, chills, change in weight  I: NEGATIVE for worrisome rashes, moles or lesions  E: NEGATIVE for vision changes or irritation  ENT: NEGATIVE for ear, mouth and throat problems  R: NEGATIVE for significant cough or SOB  CV: NEGATIVE for chest pain, palpitations or peripheral edema  GI: NEGATIVE for nausea, abdominal pain, heartburn, or change in bowel habits   male: negative for dysuria, hematuria, decreased urinary stream, erectile dysfunction, urethral discharge  M: NEGATIVE for significant arthralgias or myalgia  N: NEGATIVE for weakness, dizziness or paresthesias  P: NEGATIVE for changes in mood or affect    Problem list, Medication list, Allergies, and Medical/Social/Surgical histories reviewed in [facility] and updated as appropriate.  OBJECTIVE:     /74  Pulse 64  Temp 98.3  F (36.8  C) (Oral)  Resp 14  Ht 6' 3\" (1.905 m)  Wt 184 lb (83.5 kg)  SpO2 96%  " BMI 23 kg/m2  EXAM:  GENERAL: healthy, alert and no distress  EYES: Eyes grossly normal to inspection, PERRL and conjunctivae and sclerae normal  HENT: ear canals and TM's normal, nose and mouth without ulcers or lesions  NECK: no adenopathy, no asymmetry, masses, or scars and thyroid normal to palpation  RESP: lungs clear to auscultation - no rales, rhonchi or wheezes  CV: regular rate and rhythm, normal S1 S2, no S3 or S4, no murmur, click or rub, no peripheral edema and peripheral pulses strong  ABDOMEN: soft, nontender, no hepatosplenomegaly, no masses and bowel sounds normal  MS: no gross musculoskeletal defects noted, no edema  SKIN: numerous moles  NEURO: Normal strength and tone, mentation intact and speech normal  PSYCH: mentation appears normal, affect normal/bright    ASSESSMENT/PLAN:     (Z00.00) Routine general medical examination at a health care facility  (primary encounter diagnosis)  Comment: Plan: vaccination -tdap given  Please see patient instructions     (F33.1) Moderate recurrent major depression (H)  Comment: Plan: we discussed in detail management.  lexapro 20 mg- seem to have a pleauted effect on him, i  recommend add Wellbutrin and see if the combination help more- he does not want additionally medications    Reports would like to continue same medications for now  Self medications with alcohol is not advisable  He will cut back on the use of alcohol  encouraged to follow up in clinic in 1-2 months  Does not want medications changes for 2 months, till new job ok for a bit  Feels that he can make  improvement in mental health with   Life style changes,including  drinking alcohol less    (F51.01) Primary insomnia  Comment: Plan: traZODone (DESYREL) 50 MG tablet            (F10.10) Dysfunctional alcohol use  Comment: Plan: Comprehensive metabolic panel, GGT   denies alcohol abuse . encouraged to cut back  To less than 7 drinks a week       Numerus moles, left arm, and left abd. brown in color,  "benign appearing, if get darker or black in color, change or increase in size, larger, or spontaneously bleed or itch , rechceck in clinic as needed to consider possible excision biopsy.      Dysphagia  Gastroenterology consultation is advised for endoscopy      In addition to the preventive visit, 15  minutes of the appointment were spent evaluating and developing a treatment plan for he additional concern(s) as above.      COUNSELING:  Reviewed preventive health counseling, as reflected in patient instructions       Regular exercise       Healthy diet/nutrition         reports that he is a non-smoker but has been exposed to tobacco smoke. He has never used smokeless tobacco.    Estimated body mass index is 23 kg/(m^2) as calculated from the following:    Height as of this encounter: 6' 3\" (1.905 m).    Weight as of this encounter: 184 lb (83.5 kg).       Counseling Resources:  ATP IV Guidelines  Pooled Cohorts Equation Calculator  FRAX Risk Assessment  ICSI Preventive Guidelines  Dietary Guidelines for Americans, 2010  USDA's MyPlate  ASA Prophylaxis  Lung CA Screening    Farhana Manzanares MD  [facility]  "

## 2017-06-14 NOTE — NURSING NOTE
"Chief Complaint   Patient presents with     Physical       Initial /74  Pulse 64  Temp 98.3  F (36.8  C) (Oral)  Resp 14  Ht 6' 3\" (1.905 m)  Wt 184 lb (83.5 kg)  SpO2 96%  BMI 23 kg/m2 Estimated body mass index is 23 kg/(m^2) as calculated from the following:    Height as of this encounter: 6' 3\" (1.905 m).    Weight as of this encounter: 184 lb (83.5 kg).  BP completed using cuff size: regular    Health Maintenance that is potentially due pending provider review:  Health Maintenance Due   Topic Date Due     TETANUS IMMUNIZATION (SYSTEM ASSIGNED)  07/01/2017         Per provider    "

## 2017-06-15 LAB
ALBUMIN SERPL-MCNC: 4.6 G/DL (ref 3.4–5)
ALP SERPL-CCNC: 51 U/L (ref 40–150)
ALT SERPL W P-5'-P-CCNC: 20 U/L (ref 0–70)
ANION GAP SERPL CALCULATED.3IONS-SCNC: 7 MMOL/L (ref 3–14)
AST SERPL W P-5'-P-CCNC: 21 U/L (ref 0–45)
BILIRUB SERPL-MCNC: 0.7 MG/DL (ref 0.2–1.3)
BUN SERPL-MCNC: 16 MG/DL (ref 7–30)
CALCIUM SERPL-MCNC: 8.9 MG/DL (ref 8.5–10.1)
CHLORIDE SERPL-SCNC: 106 MMOL/L (ref 94–109)
CO2 SERPL-SCNC: 27 MMOL/L (ref 20–32)
CREAT SERPL-MCNC: 1.15 MG/DL (ref 0.66–1.25)
GFR SERPL CREATININE-BSD FRML MDRD: 74 ML/MIN/1.7M2
GGT SERPL-CCNC: 14 U/L (ref 0–75)
GLUCOSE SERPL-MCNC: 94 MG/DL (ref 70–99)
POTASSIUM SERPL-SCNC: 4.4 MMOL/L (ref 3.4–5.3)
PROT SERPL-MCNC: 7.3 G/DL (ref 6.8–8.8)
SODIUM SERPL-SCNC: 140 MMOL/L (ref 133–144)

## 2017-06-15 ASSESSMENT — PATIENT HEALTH QUESTIONNAIRE - PHQ9: SUM OF ALL RESPONSES TO PHQ QUESTIONS 1-9: 15

## 2017-09-29 DIAGNOSIS — F33.0 MAJOR DEPRESSIVE DISORDER, RECURRENT EPISODE, MILD (H): ICD-10-CM

## 2017-10-01 NOTE — TELEPHONE ENCOUNTER
Lexapro      Last Written Prescription Date: 02/17/2017  Last Fill Quantity: 30, # refills: 6  Last Office Visit with [facility] primary care provider:  06/14/2017        Last PHQ-9 score on record=   PHQ-9 SCORE 6/14/2017   Total Score -   Total Score 15

## 2017-10-02 RX ORDER — ESCITALOPRAM OXALATE 20 MG/1
TABLET ORAL
Qty: 30 TABLET | Refills: 0 | Status: SHIPPED | OUTPATIENT
Start: 2017-10-02 | End: 2017-11-08

## 2017-10-02 NOTE — TELEPHONE ENCOUNTER
Informed patient he's due for appt    Next 5 appointments (look out 90 days)     Oct 11, 2017  8:30 AM CDT   Office Visit with Mabel Wiggins MD   [facility]    [address]  [facility] [id number] [phone]                Medication is being filled for 1 time refill only due to:  upcoming appt.  Ibis KING RN    Noted by Dr Manzanares at 6/14/2017 appt:  Moderate recurrent major depression (H)  Comment: Plan: we discussed in detail management.  Lexapro 20 mg- seem to have a pleauted effect on him, i recommend add Wellbutrin and see if the combination help more- he does not want additionally medications  Reports would like to continue same medications for now  Self medications with alcohol is not advisable  He will cut back on the use of alcohol  encouraged to follow up in clinic in 1-2 months  Does not want medications changes for 2 months, till new job ok for a bit  Feels that he can make  improvement in mental health with   Life style changes,including  drinking alcohol less

## 2017-11-08 DIAGNOSIS — F33.0 MAJOR DEPRESSIVE DISORDER, RECURRENT EPISODE, MILD (H): ICD-10-CM

## 2017-11-08 RX ORDER — ESCITALOPRAM OXALATE 20 MG/1
TABLET ORAL
Qty: 30 TABLET | Refills: 0 | Status: ON HOLD | OUTPATIENT
Start: 2017-11-08 | End: 2018-07-18

## 2017-11-08 NOTE — TELEPHONE ENCOUNTER
Patient called back.    He scheduled an appointment with Dr. Monroe Wednesday, 11/15.  Please send in the refill.

## 2017-11-08 NOTE — TELEPHONE ENCOUNTER
Reason for Call:  Medication or medication refill:    Do you use a [facility]?  Name of the pharmacy and phone number for the current request:  [facility] - #[id number] - [address] /[facility]-  [phone]    Name of the medication requested: escitalopram (LEXAPRO) 20 MG tablet     Other request: previous appointment was cancelled by the clinic.  I offered to schedule, pt not sure if it should be phone or in person.  So not scheduled yet.  Patient is out of medication today.  I did let him know he could get 3 from pharmacy.       Can we leave a detailed message on this number? YES    Phone number patient can be reached at: Cell number on file:    Telephone Information:   Mobile [phone]   Please call back     Best Time: any    Call taken on 11/8/2017 at 2:15 PM by Yesy Morales

## 2017-11-08 NOTE — TELEPHONE ENCOUNTER
Left detailed VM for patient.  Per AS' notes, pt needs to f/u in clinic   Asked that pt schedule appt, then can send in 30 day Rx (pended)  Ibis KING RN    Noted by AS at 6/14/2017 physical:  Moderate recurrent major depression (H)  Comment: Plan: we discussed in detail management.  lexapro 20 mg- seem to have a pleauted effect on him, i  recommend add Wellbutrin and see if the combination help more- he does not want additionally medications  Reports would like to continue same medications for now  Self medications with alcohol is not advisable  He will cut back on the use of alcohol  encouraged to follow up in clinic in 1-2 months  Does not want medications changes for 2 months, till new job ok for a bit  Feels that he can make  improvement in mental health with   Life style changes,including  drinking alcohol less

## 2017-11-15 ENCOUNTER — VIRTUAL VISIT (OUTPATIENT)
Dept: FAMILY MEDICINE | Facility: CLINIC | Age: 32
End: 2017-11-15
Payer: COMMERCIAL

## 2017-11-15 DIAGNOSIS — F32.0 MILD MAJOR DEPRESSION (H): ICD-10-CM

## 2017-11-15 DIAGNOSIS — F41.0 PANIC ATTACK: Primary | ICD-10-CM

## 2017-11-15 PROCEDURE — 99441 ZZC PHYSICIAN TELEPHONE EVALUATION 5-10 MIN: CPT | Performed by: FAMILY MEDICINE

## 2017-11-15 ASSESSMENT — PATIENT HEALTH QUESTIONNAIRE - PHQ9: SUM OF ALL RESPONSES TO PHQ QUESTIONS 1-9: 14

## 2017-11-15 NOTE — PROGRESS NOTES
"Nora Ayala is a 32 year old male who is being evaluated via a telephone visit.      The patient has been notified of following:     \"This telephone visit will be conducted via a call between you and your physician/provider. We have found that certain health care needs can be provided without the need for a physical exam.  This service lets us provide the care you need with a short phone conversation.  If a prescription is necessary we can send it directly to your pharmacy.  If lab work is needed we can place an order for that and you can then stop by our lab to have the test done at a later time.    We will bill your insurance company for this service.  Please check with your medical insurance if this type of visit is covered. You may be responsible for the cost of this type of visit if insurance coverage is denied.  The typical cost is $30 (10min), $59 (11-20min) and $85 (21-30min).  Most often these visits are shorter than 10 minutes.    If during the course of the call the physician/provider feels a telephone visit is not appropriate, you will not be charged for this service.\"       Consent has been obtained for this service by 2 care team members: yes. See the scanned image in the medical record.    Nora Ayala complains of  Recheck Medication      I have reviewed and updated the patient's Past Medical History, Social History, Family History and Medication List.    ALLERGIES  Amoxicillin and Ciclopirox    Bernarda Cherry MA (MA signature)    Additional provider notes:   Has been on lexapro for the last year  increased the dose in the last year  Feels slightly better  Wonders about options for this  Is taking lexapro 20 mg daily  Takes trazodone daily  Has tried celexa in the past - did not help fully  Does have anxiety as well  Still needs trazodone for sleep  Has not used lorazepam much - only sparingly    PHQ-9 SCORE 2/17/2017 6/14/2017 11/15/2017   Total Score - - -   Total Score 16 15 14     NADIA-7 SCORE " 3/10/2015 1/6/2017   Total Score 18 -   Total Score - 15           Assessment/Plan:  (F41.0) Panic attack  (primary encounter diagnosis)  Comment: \  Plan: sertraline (ZOLOFT) 50 MG tablet        \    (F32.0) Mild major depression (H)  Comment: \  Plan: sertraline (ZOLOFT) 50 MG tablet        \       I have reviewed the note as documented above.  This accurately captures the substance of my conversation with the patient,      Total time of call between patient and provider was 10 minutes

## 2017-11-15 NOTE — MR AVS SNAPSHOT
After Visit Summary   11/15/2017    Nora Ayala    MRN: [id number]           Patient Information     Date Of Birth          1985        Visit Information        Provider Department      11/15/2017 1:30 PM Mabel Wiggins MD [facility]        Today's Diagnoses     Panic attack    -  1    Mild major depression (H)           Follow-ups after your visit        Who to contact     If you have questions or need follow up information about today's clinic visit or your schedule please contact [facility] directly at [phone].  Normal or non-critical lab and imaging results will be communicated to you by [employer], letter or phone within 4 business days after the clinic has received the results. If you do not hear from us within 7 days, please contact the clinic through [employer] or phone. If you have a critical or abnormal lab result, we will notify you by phone as soon as possible.  Submit refill requests through [employer] or call your pharmacy and they will forward the refill request to us. Please allow 3 business days for your refill to be completed.          Additional Information About Your Visit        MyChart Information     [employer] gives you secure access to your electronic health record. If you see a primary care provider, you can also send messages to your care team and make appointments. If you have questions, please call your primary care clinic.  If you do not have a primary care provider, please call [phone] and they will assist you.        Care EveryWhere ID     This is your Care EveryWhere ID. This could be used by other organizations to access your [facility] medical records  [id number]         Blood Pressure from Last 3 Encounters:   06/14/17 110/74   02/17/17 120/80   01/10/17 135/80    Weight from Last 3 Encounters:   06/14/17 184 lb (83.5 kg)   02/17/17 181 lb (82.1 kg)   01/10/17 187 lb (84.8 kg)              Today, you had the following     No  orders found for display         Today's Medication Changes          These changes are accurate as of: 11/15/17  2:16 PM.  If you have any questions, ask your nurse or doctor.               Start taking these medicines.        Dose/Directions    sertraline 50 MG tablet   Commonly known as:  ZOLOFT   Used for:  Panic attack, Mild major depression (H)   Started by:  Mabel Wiggins MD        Take 1/2 tablet (25 mg) for 1-2 weeks, then increase to 1 tablet orally daily   Quantity:  30 tablet   Refills:  1            Where to get your medicines      These medications were sent to [facility] [id number] [place] [address] [address] [facility] [address], [facility] [address]     Phone:  [phone]     sertraline 50 MG tablet                Primary Care Provider Office Phone # Fax #    Mabel Wiggins [phone] [address] [address]        Equal Access to Services     AUGUSTINE ZEPEDA AH: Hadii ryan ku hadasho Soomaali, waaxda luqadaha, qaybta kaalmada adeegyada, patricia guajardo haykelsi asif . So [facility] [phone].    ATENCIÓN: Si habla español, tiene a ho disposición servicios gratuitos de asistencia lingüística. Llame al [phone].    We comply with applicable federal civil rights laws and [place] laws. We do not discriminate on the basis of race, color, national origin, age, disability, sex, sexual orientation, or gender identity.            Thank you!     Thank you for choosing [facility]  for your care. Our goal is always to provide you with excellent care. Hearing back from our patients is one way we can continue to improve our services. Please take a few minutes to complete the written survey that you may receive in the mail after your visit with us. Thank you!             Your Updated Medication List - Protect others around you: Learn how to safely use, store and throw away your  medicines at www.disposemymeds.org.          This list is accurate as of: 11/15/17  2:16 PM.  Always use your most recent med list.                   Brand Name Dispense Instructions for use Diagnosis    escitalopram 20 MG tablet    LEXAPRO    30 tablet    TAKE 1 TABLET(20 MG) BY MOUTH DAILY    Major depressive disorder, recurrent episode, mild (H)       LORazepam 1 MG tablet    ATIVAN    20 tablet    Take 0.5 tablets (0.5 mg) by mouth 2 times daily as needed for anxiety (or panic)    Major depressive disorder, recurrent episode, mild (H)       propranolol 20 MG tablet    INDERAL    60 tablet    Take 1 tablet (20 mg) by mouth 2 times daily For performance anxiety at work    Major depressive disorder, recurrent episode, mild (H)       sertraline 50 MG tablet    ZOLOFT    30 tablet    Take 1/2 tablet (25 mg) for 1-2 weeks, then increase to 1 tablet orally daily    Panic attack, Mild major depression (H)       traZODone 50 MG tablet    DESYREL    30 tablet    Take 1 tablet (50 mg) by mouth nightly as needed for sleep    Primary insomnia

## 2017-12-04 DIAGNOSIS — F33.0 MAJOR DEPRESSIVE DISORDER, RECURRENT EPISODE, MILD (H): ICD-10-CM

## 2017-12-04 NOTE — TELEPHONE ENCOUNTER
We were planning to taper off the lexapro - transitioning from lexapro to sertraline    Can he fill out a PHQ?    Can take lexapro every other day for 1 week the stop  Can stay at 50 mg dose of sertraline or increase to 100 mg daily based on PHQ - let's see what his numbers are    Mabel Wiggins MD       PHQ-9 SCORE 2/17/2017 6/14/2017 11/15/2017   Total Score - - -   Total Score 16 15 14

## 2017-12-04 NOTE — TELEPHONE ENCOUNTER
Routing refill request to provider for review/approval because:  Is patient on Lexapro in addition to newly started Sertraline?  Ibis KING RN    Requested Prescriptions   Pending Prescriptions Disp Refills     escitalopram (LEXAPRO) 20 MG tablet [Pharmacy Med Name: ESCITALOPRAM 20MG TABLETS] 30 tablet 0     Sig: TAKE 1 TABLET(20 MG) BY MOUTH DAILY    SSRIs Protocol Failed    12/4/2017  8:41 AM       Failed - PHQ-9 score less than 5 in past 6 months    Please review PHQ-9 score.          Passed - Medication is NOT Bupropion    If the medication is Bupropion (Wellbutrin), and the patient is taking for smoking cessation; OK to refill.         Passed - Patient is age 18 or older       Passed - Recent (6 mo) or future visit with authorizing provider's specialty    Patient had office visit in the last 6 months or has a visit in the next 30 days with authorizing provider.  See chart review.

## 2017-12-14 RX ORDER — ESCITALOPRAM OXALATE 20 MG/1
TABLET ORAL
Start: 2017-12-14

## 2017-12-14 NOTE — TELEPHONE ENCOUNTER
Will wait for patient to callback - no response  Denied refill request to pharmacy - pt needs to call clinic  Ibis KING RN

## 2017-12-14 NOTE — TELEPHONE ENCOUNTER
I believe we were switching to Zoloft from Lexapro.  Can you call and ask him to confirm?  Also we could have him fill out a PHQ or NADIA    SN

## 2018-06-07 ENCOUNTER — OFFICE VISIT (OUTPATIENT)
Dept: FAMILY MEDICINE | Facility: CLINIC | Age: 33
End: 2018-06-07
Payer: COMMERCIAL

## 2018-06-07 VITALS
HEART RATE: 76 BPM | HEIGHT: 75 IN | WEIGHT: 162.5 LBS | TEMPERATURE: 98.2 F | SYSTOLIC BLOOD PRESSURE: 109 MMHG | BODY MASS INDEX: 20.21 KG/M2 | DIASTOLIC BLOOD PRESSURE: 64 MMHG | OXYGEN SATURATION: 97 % | RESPIRATION RATE: 16 BRPM

## 2018-06-07 DIAGNOSIS — K22.4 ESOPHAGEAL DYSMOTILITY: Primary | ICD-10-CM

## 2018-06-07 DIAGNOSIS — R13.19 ESOPHAGEAL DYSPHAGIA: ICD-10-CM

## 2018-06-07 PROCEDURE — 99214 OFFICE O/P EST MOD 30 MIN: CPT | Performed by: FAMILY MEDICINE

## 2018-06-07 NOTE — PROGRESS NOTES
SUBJECTIVE:   Nora Ayala is a 32 year old male who presents to clinic today for the following health issues:      Chief Complaint   Patient presents with     Consult     9 months of respitory issues along with difficulty swollowing     Difficulty swallowing both liquids and solids  Has had dry cough as well  - does not feel like a cold  Is able to eat   Few times has had food lodged in his throat  Reports he notices that it is difficult not to initiate swallow but midway through the swallow.  Has been slowly worsening over the last 9 months  Has not noticed heartburn symptoms but does have this dry cough which is relatively new in the last few weeks    Has also noted fatigue and exhaustion  Working hard many hours.  Admits to not eating as much and has lost some weight as a result  Feels that sleep is well protected  Wakes up rested  Uses trazodone this works well.    Wt Readings from Last 4 Encounters:   06/07/18 162 lb 8 oz (73.7 kg)   06/14/17 184 lb (83.5 kg)   02/17/17 181 lb (82.1 kg)   01/10/17 187 lb (84.8 kg)     Has lost weight unintentional but thinks this is diet related - is not eating as much.        Problem list and histories reviewed & adjusted, as indicated.  Additional history: as documented    Patient Active Problem List   Diagnosis     CARDIOVASCULAR SCREENING; LDL GOAL LESS THAN 160     Mild major depression (H)     Insomnia     Family history of cancer of digestive organ     External hemorrhoids     Panic attack     Moderate recurrent major depression (H)     No past surgical history on file.    Social History   Substance Use Topics     Smoking status: Passive Smoke Exposure - Never Smoker     Smokeless tobacco: Never Used     Alcohol use 0.0 oz/week     0 Standard drinks or equivalent per week      Comment: occa- social      Family History   Problem Relation Age of Onset     Depression Mother      Gynecology Mother      Thyroid Disease Mother      CANCER Mother 59     Neurologic Disorder  "Father      MIGRAINE     CANCER Maternal Grandmother      LIVER CANCER     Allergies Other      SELF- CICLOR, AMOXICILLIN     Depression Other      SELF     Eye Disorder Other      SELF     Neurologic Disorder Other      SELF     Respiratory Other      SELF         Current Outpatient Prescriptions   Medication Sig Dispense Refill     escitalopram (LEXAPRO) 20 MG tablet TAKE 1 TABLET(20 MG) BY MOUTH DAILY 30 tablet 0     LORazepam (ATIVAN) 1 MG tablet Take 0.5 tablets (0.5 mg) by mouth 2 times daily as needed for anxiety (or panic) 20 tablet 1     propranolol (INDERAL) 20 MG tablet Take 1 tablet (20 mg) by mouth 2 times daily For performance anxiety at work 60 tablet 1     sertraline (ZOLOFT) 50 MG tablet Take 1 tablet (50 mg) by mouth daily 90 tablet 3     traZODone (DESYREL) 50 MG tablet Take 1 tablet (50 mg) by mouth nightly as needed for sleep 30 tablet 11     BP Readings from Last 3 Encounters:   06/07/18 109/64   06/14/17 110/74   02/17/17 120/80    Wt Readings from Last 3 Encounters:   06/07/18 162 lb 8 oz (73.7 kg)   06/14/17 184 lb (83.5 kg)   02/17/17 181 lb (82.1 kg)                    Reviewed and updated as needed this visit by clinical staff  Tobacco  Allergies  Meds       Reviewed and updated as needed this visit by Provider         ROS:  Constitutional, HEENT, cardiovascular, pulmonary, gi and gu systems are negative, except as otherwise noted.    OBJECTIVE:                                                    /64 (BP Location: Left arm, Cuff Size: Adult Regular)  Pulse 76  Temp 98.2  F (36.8  C) (Oral)  Resp 16  Ht 6' 3\" (1.905 m)  Wt 162 lb 8 oz (73.7 kg)  SpO2 97%  BMI 20.31 kg/m2  Body mass index is 20.31 kg/(m^2).  GENERAL APPEARANCE: healthy, alert and no distress  HENT: nose and mouth without ulcers or lesions and oral mucous membranes moist  NECK: no adenopathy, no asymmetry, masses, or scars and thyroid normal to palpation  ABDOMEN: soft, nontender, without hepatosplenomegaly or " masses and bowel sounds normal  PSYCH: mentation appears normal and affect normal/bright         ASSESSMENT/PLAN:                                                    1. Esophageal dysmotility  Discussed having him set up EGD for evaluation of esophageal dysmotility and certainly if this is not helpful would set up esophagram with barium to look at active swallow recommended that he start omeprazole just to cover for possible cough related acid  We will also check for H. pylori to make sure this is not an issue    - GASTROENTEROLOGY ADULT REF PROCEDURE ONLY Tucker Miguel [phone]; No Provider Preference  - H Pylori antigen, stool; Future    2. Esophageal dysphagia    - GASTROENTEROLOGY ADULT REF PROCEDURE ONLY [facility] Miguel [phone]; No Provider Preference  - H Pylori antigen, stool; Future      Follow up with Provider -will contact once I have results  If his symptoms are worsening certainly we should see him sooner    Mabel Wiggins MD  [facility]

## 2018-06-07 NOTE — NURSING NOTE
"Chief Complaint   Patient presents with     Consult     9 months of respitory issues along with difficulty swollowing     initial /64 (BP Location: Left arm, Cuff Size: Adult Regular)  Pulse 76  Temp 98.2  F (36.8  C) (Oral)  Resp 16  Ht 6' 3\" (1.905 m)  Wt 162 lb 8 oz (73.7 kg)  SpO2 97%  BMI 20.31 kg/m2 Estimated body mass index is 20.31 kg/(m^2) as calculated from the following:    Height as of this encounter: 6' 3\" (1.905 m).    Weight as of this encounter: 162 lb 8 oz (73.7 kg).  BP completed using cuff size: regular.  L  arm      Health Maintenance that is potentially due pending provider review:  NONE    n/a    Emmett Milian ma  "

## 2018-06-07 NOTE — MR AVS SNAPSHOT
After Visit Summary   6/7/2018    Nora Ayala    MRN: [id number]           Patient Information     Date Of Birth          1985        Visit Information        Provider Department      6/7/2018 2:30 PM Mabel Wiggins MD [facility]        Today's Diagnoses     Esophageal dysmotility    -  1    Esophageal dysphagia          Care Instructions    Lets start with an endoscopy to look for narrowing or blockages.  If this is not helpful then the next step is an Esophagram to see what happens with your swallow    Start Omeprazole 20 mg daily for 4-6 weeks    We will check you for H ptylori which can affect acid in the stomach and esophagus          Follow-ups after your visit        Additional Services     GASTROENTEROLOGY ADULT REF PROCEDURE ONLY Tucker Myriam [phone]; No Provider Preference       Last Lab Result: Creatinine (mg/dL)       Date                     Value                 06/14/2017               1.15             ----------  Body mass index is 20.31 kg/(m^2).      Patient will be contacted to schedule procedure.     Please be aware that coverage of these services is subject to the terms and limitations of your health insurance plan.  Call member services at your health plan with any benefit or coverage questions.  Any procedures must be performed at a [facility] facility OR coordinated by your clinic's referral office.    Please bring the following with you to your appointment:    (1) Any X-Rays, CTs or MRIs which have been performed.  Contact the facility where they were done to arrange for  prior to your scheduled appointment.    (2) List of current medications   (3) This referral request   (4) Any documents/labs given to you for this referral                  Future tests that were ordered for you today     Open Future Orders        Priority Expected Expires Ordered    H Pylori antigen, stool Routine  7/7/2018 6/7/2018            Who to contact      "If you have questions or need follow up information about today's clinic visit or your schedule please contact [facility] directly at [phone].  Normal or non-critical lab and imaging results will be communicated to you by MyChart, letter or phone within 4 business days after the clinic has received the results. If you do not hear from us within 7 days, please contact the clinic through [employer] or phone. If you have a critical or abnormal lab result, we will notify you by phone as soon as possible.  Submit refill requests through [email or web address] or call your pharmacy and they will forward the refill request to us. Please allow 3 business days for your refill to be completed.          Additional Information About Your Visit        [employer] D&R Information     [email or web address] gives you secure access to your electronic health record. If you see a primary care provider, you can also send messages to your care team and make appointments. If you have questions, please call your primary care clinic.  If you do not have a primary care provider, please call [phone] and they will assist you.        Care EveryWhere ID     This is your Care EveryWhere ID. This could be used by other organizations to access your [facility] medical records  [id number]        Your Vitals Were     Pulse Temperature Respirations Height Pulse Oximetry BMI (Body Mass Index)    76 98.2  F (36.8  C) (Oral) 16 6' 3\" (1.905 m) 97% 20.31 kg/m2       Blood Pressure from Last 3 Encounters:   06/07/18 109/64   06/14/17 110/74   02/17/17 120/80    Weight from Last 3 Encounters:   06/07/18 162 lb 8 oz (73.7 kg)   06/14/17 184 lb (83.5 kg)   02/17/17 181 lb (82.1 kg)              We Performed the Following     GASTROENTEROLOGY ADULT REF PROCEDURE ONLY [facility] Miguel [phone]; No Provider Preference        Primary Care Provider Office Phone # Fax #    Mabel Wiggins [phone]       [address] " [address]        Equal Access to Services     : Hadii ryan kraus gonsalo Riversali, watannerda luqadaha, qaybta kaalmagigi goodwin, patricia thakur. So [facility] [phone].    ATENCIÓN: Si habla español, tiene a ho disposición servicios gratuitos de asistencia lingüística. Dashaame al [phone].    We comply with applicable federal civil rights laws and [place] laws. We do not discriminate on the basis of race, color, national origin, age, disability, sex, sexual orientation, or gender identity.            Thank you!     Thank you for choosing [facility]  for your care. Our goal is always to provide you with excellent care. Hearing back from our patients is one way we can continue to improve our services. Please take a few minutes to complete the written survey that you may receive in the mail after your visit with us. Thank you!             Your Updated Medication List - Protect others around you: Learn how to safely use, store and throw away your medicines at www.disposemymeds.org.          This list is accurate as of 6/7/18  2:56 PM.  Always use your most recent med list.                   Brand Name Dispense Instructions for use Diagnosis    escitalopram 20 MG tablet    LEXAPRO    30 tablet    TAKE 1 TABLET(20 MG) BY MOUTH DAILY    Major depressive disorder, recurrent episode, mild (H)       LORazepam 1 MG tablet    ATIVAN    20 tablet    Take 0.5 tablets (0.5 mg) by mouth 2 times daily as needed for anxiety (or panic)    Major depressive disorder, recurrent episode, mild (H)       propranolol 20 MG tablet    INDERAL    60 tablet    Take 1 tablet (20 mg) by mouth 2 times daily For performance anxiety at work    Major depressive disorder, recurrent episode, mild (H)       sertraline 50 MG tablet    ZOLOFT    90 tablet    Take 1 tablet (50 mg) by mouth daily    Panic attack, Mild major depression (H)       traZODone 50 MG tablet    DESYREL    30 tablet    Take 1 tablet (50 mg)  by mouth nightly as needed for sleep    Primary insomnia

## 2018-06-07 NOTE — PATIENT INSTRUCTIONS
Lets start with an endoscopy to look for narrowing or blockages.  If this is not helpful then the next step is an Esophagram to see what happens with your swallow    Start Omeprazole 20 mg daily for 4-6 weeks    We will check you for H ptylori which can affect acid in the stomach and esophagus

## 2018-06-11 DIAGNOSIS — F51.01 PRIMARY INSOMNIA: ICD-10-CM

## 2018-06-12 RX ORDER — TRAZODONE HYDROCHLORIDE 50 MG/1
TABLET, FILM COATED ORAL
Qty: 30 TABLET | Refills: 5 | Status: SHIPPED | OUTPATIENT
Start: 2018-06-12 | End: 2018-12-10

## 2018-06-12 NOTE — TELEPHONE ENCOUNTER
"Prescription approved per [facility] Refill Protocol.  Olesya Banerjee RN  Requested Prescriptions   Signed Prescriptions Disp Refills     traZODone (DESYREL) 50 MG tablet 30 tablet 5     Sig: TAKE 1 TABLET BY MOUTH EVERY NIGHT AT BEDTIME AS NEEDED FOR SLEEP    Serotonin Modulators Passed    6/11/2018 11:49 AM       Passed - Recent (12 mo) or future (30 days) visit within the authorizing provider's specialty    Patient had office visit in the last 12 months or has a visit in the next 30 days with authorizing provider or within the authorizing provider's specialty.  See \"Patient Info\" tab in inbasket, or \"Choose Columns\" in Meds & Orders section of the refill encounter.           Passed - Patient is age 18 or older          "

## 2018-07-10 RX ORDER — LIDOCAINE 40 MG/G
CREAM TOPICAL
Status: CANCELLED | OUTPATIENT
Start: 2018-07-10

## 2018-07-10 RX ORDER — ONDANSETRON 2 MG/ML
4 INJECTION INTRAMUSCULAR; INTRAVENOUS
Status: CANCELLED | OUTPATIENT
Start: 2018-07-10

## 2018-07-11 ENCOUNTER — HOSPITAL ENCOUNTER (OUTPATIENT)
Facility: CLINIC | Age: 33
Discharge: HOME | End: 2018-07-11
Attending: INTERNAL MEDICINE | Admitting: INTERNAL MEDICINE
Payer: COMMERCIAL

## 2018-07-11 ENCOUNTER — SURGERY (OUTPATIENT)
Age: 33
End: 2018-07-11

## 2018-07-11 PROCEDURE — 40000873 ZZH CANCELLED SURGERY UP TO 15 MINS: Performed by: INTERNAL MEDICINE

## 2018-07-18 ENCOUNTER — HOSPITAL ENCOUNTER (OUTPATIENT)
Facility: CLINIC | Age: 33
Discharge: HOME | End: 2018-07-18
Attending: INTERNAL MEDICINE | Admitting: INTERNAL MEDICINE
Payer: COMMERCIAL

## 2018-07-18 ENCOUNTER — SURGERY (OUTPATIENT)
Age: 33
End: 2018-07-18

## 2018-07-18 VITALS
SYSTOLIC BLOOD PRESSURE: 104 MMHG | HEIGHT: 75 IN | DIASTOLIC BLOOD PRESSURE: 60 MMHG | RESPIRATION RATE: 16 BRPM | BODY MASS INDEX: 20.51 KG/M2 | WEIGHT: 165 LBS | OXYGEN SATURATION: 95 %

## 2018-07-18 DIAGNOSIS — K21.00 GASTROESOPHAGEAL REFLUX DISEASE WITH ESOPHAGITIS: Primary | ICD-10-CM

## 2018-07-18 LAB — UPPER GI ENDOSCOPY: NORMAL

## 2018-07-18 PROCEDURE — 43239 EGD BIOPSY SINGLE/MULTIPLE: CPT | Performed by: INTERNAL MEDICINE

## 2018-07-18 PROCEDURE — 88305 TISSUE EXAM BY PATHOLOGIST: CPT | Mod: 26 | Performed by: INTERNAL MEDICINE

## 2018-07-18 PROCEDURE — 25000125 ZZHC RX 250: Performed by: INTERNAL MEDICINE

## 2018-07-18 PROCEDURE — 43249 ESOPH EGD DILATION <30 MM: CPT | Performed by: INTERNAL MEDICINE

## 2018-07-18 PROCEDURE — 88305 TISSUE EXAM BY PATHOLOGIST: CPT | Performed by: INTERNAL MEDICINE

## 2018-07-18 PROCEDURE — 25000128 H RX IP 250 OP 636: Performed by: INTERNAL MEDICINE

## 2018-07-18 PROCEDURE — G0500 MOD SEDAT ENDO SERVICE >5YRS: HCPCS | Performed by: INTERNAL MEDICINE

## 2018-07-18 RX ORDER — LIDOCAINE 40 MG/G
CREAM TOPICAL
Status: DISCONTINUED | OUTPATIENT
Start: 2018-07-18 | End: 2018-07-18 | Stop reason: HOSPADM

## 2018-07-18 RX ORDER — FENTANYL CITRATE 50 UG/ML
INJECTION, SOLUTION INTRAMUSCULAR; INTRAVENOUS PRN
Status: DISCONTINUED | OUTPATIENT
Start: 2018-07-18 | End: 2018-07-18 | Stop reason: HOSPADM

## 2018-07-18 RX ORDER — ONDANSETRON 2 MG/ML
4 INJECTION INTRAMUSCULAR; INTRAVENOUS
Status: DISCONTINUED | OUTPATIENT
Start: 2018-07-18 | End: 2018-07-18 | Stop reason: HOSPADM

## 2018-07-18 RX ORDER — PANTOPRAZOLE SODIUM 40 MG/1
40 TABLET, DELAYED RELEASE ORAL DAILY
Qty: 30 TABLET | Refills: 1 | Status: SHIPPED | OUTPATIENT
Start: 2018-07-18 | End: 2018-09-28

## 2018-07-18 RX ADMIN — TOPICAL ANESTHETIC 1 SPRAY: 200 SPRAY DENTAL; PERIODONTAL at 15:23

## 2018-07-18 RX ADMIN — FENTANYL CITRATE 100 MCG: 50 INJECTION, SOLUTION INTRAMUSCULAR; INTRAVENOUS at 15:22

## 2018-07-18 RX ADMIN — MIDAZOLAM 2 MG: 1 INJECTION INTRAMUSCULAR; INTRAVENOUS at 15:24

## 2018-07-18 RX ADMIN — MIDAZOLAM 2 MG: 1 INJECTION INTRAMUSCULAR; INTRAVENOUS at 15:21

## 2018-07-18 NOTE — LETTER
August 16, 2018      Nora Ayala  [address]  [place] [address]        Dear ,    We are writing to inform you of your test results.    Based on the preliminary results of your endoscopy it looks like he might have eosinophilic esophagitis which is an allergic response usually to something in your diet.  Definitely make an appointment to see me so we can talk this through seeing an allergist and doing a thorough elimination diet are the next steps as well as medicines for calming down the inflammation.     Resulted Orders   UPPER GI ENDOSCOPY   Result Value Ref Range    Upper GI Endoscopy       [facility] Endoscopy Department  _______________________________________________________________________________  Patient Name: Nora Ayala             Procedure Date: 7/18/2018 3:15 PM  MRN: [id number]                       Account Number: [id number]  YOB: 1985               Admit Type: Outpatient  Age: 32                               Room: 3                          Note Status: Finalized                Attending MD: Jayme Montez MD  Total Sedation Time:                  Instrument Name: 314 GIF- Gastroscope  _______________________________________________________________________________     Procedure:                Upper GI endoscopy  Indications:              Functional Dyspepsia, Heartburn  Providers:                Jayme Montez MD, Daniella Pike, RN  Referring MD:             Mabel Wiggins MD  Medicines:                Midazolam 4 mg IV, Fentanyl 100 micrograms IV,                             Cetacaine spray  Complication s:            No immediate complications.  _______________________________________________________________________________  Procedure:                Pre-Anesthesia Assessment:                            - Prior to the procedure, a History and Physical                             was performed, and patient medications and                              allergies were reviewed. The patient is competent.                             The risks and benefits of the procedure and the                             sedation options and risks were discussed with the                             patient. All questions were answered and informed                             consent was obtained. Patient identification and                             proposed procedure were verified by the physician                             in the pre-procedure area. Mental Status                             Examination: alert and oriented. Airway                             Examination: normal oropharyngeal airway and  neck                             mobility. Respiratory Examination: clear to                             auscultation. CV Examination: normal. Prophylactic                             Antibiotics: The patient does not require                             prophylactic antibiotics. Prior Anticoagulants: The                             patient has taken no previous anticoagulant or                             antiplatelet agents. ASA Grade Assessment: II - A                             patient with mild systemic disease. After reviewing                             the risks and benefits, the patient was deemed in                             satisfactory condition to undergo the procedure.                             The anesthesia plan was to use moderate sedation /                             analgesia (conscious sedation). Immediately prior                             to administration of medications, the patient was                             re-assessed for adequacy to receive sedati ves. The                             heart rate, respiratory rate, oxygen saturations,                             blood pressure, adequacy of pulmonary ventilation,                             and response to care were monitored throughout the                              procedure. The physical status of the patient was                             re-assessed after the procedure.                            After obtaining informed consent, the endoscope was                             passed under direct vision. Throughout the                             procedure, the patient's blood pressure, pulse, and                             oxygen saturations were monitored continuously. The                             Endoscope was introduced through the mouth, and                             advanced to the third part of duodenum. The upper                             GI endoscopy was accomplished without difficulty.                             The patient tolerated the procedure well.                                                                                    Findings:       Mucosal changes including feline appearance, longitudinal furrows,        small-caliber esophagus and stenosis were found in the upper third of        the esophagus, in the middle third of the esophagus and in the lower        third of the esophagus. Biopsies were taken with a cold forceps for        histology. A TTS dilator was passed through the scope. Dilation with a        12-13.5-15 mm balloon dilator was performed to 15 mm.       A small hiatal hernia was present.       The entire examined stomach was normal. Biopsies were taken with a cold        forceps for histology. Biopsies were taken with a cold forceps for        Helicobacter pylori testing.       The cardia and gastric fundus were normal on retroflexion.       The duodenal bulb, first portion of the duodenum and second portion of        the duodenum were normal.                                                                                    Impression:               - Esophageal mucosal changes suggestive of                             eosinophilic esophagitis. Biopsied. Dilated.                            - Small hiatal hernia.                             - Normal stomach. Biopsied.                            - Normal duodenal bulb, first portion of the                             duodenum and second portion of the duodenum.  Recommendation:           - Anti Relux Precautions                            - Use Protonix (pantoprazole) 40 mg PO daily.                            - I will contact patient with Biopsy result in 1-2                             weeks. Please contact our Office at  at                             [facility] if ther is any questions,                            - Repeat upper endoscopy in 1 month for retreatment.                            - Mechanical soft diet.                                                                                   Procedure Code(s):        --- Professional ---       81921, Esophagogastroduodenoscopy, flexible, transoral; with        transendoscopic balloon dilation of esophagus (less than 30 mm diameter)       25448, 59, Esophagogastroduodenoscopy, flexible, transoral; with biopsy,        single or multiple  Diagnosis Code(s):       --- Professional ---       K22.8, Other specified diseases of esophagus       K44.9, Diaphragmatic hernia without obstruction or gangrene       K30, Functional dyspepsia       R12, Heartburn    CPT copyright 2017 American Medical Association. All rights reserved.    The codes documented in this report are preliminary and upon  review may   be revised to meet current compliance requirements.    __________________  Jayme Montez MD  7/18/2018 3:35:41 PM  I was physically present for the entire viewing portion of the exam.  Jayme Montez MD  Number of Addenda: 0    Note Initiated On: 7/18/2018 3:15 PM  MRN:                      [id number]  Procedure Date:           7/18/2018 3:15:08  PM  Total Procedure Duration: 0 hours 4 minutes 8 seconds   Estimated Blood Loss:       Scope In: 3:25:44 PM  Scope Out: 3:29:52 PM         If you have any questions  or concerns, please call the clinic at the number listed above.       Sincerely,        No name on file.

## 2018-07-19 LAB — COPATH REPORT: NORMAL

## 2018-08-16 PROBLEM — K20.0 ESOPHAGITIS, EOSINOPHILIC: Status: ACTIVE | Noted: 2018-08-16

## 2018-08-16 NOTE — PROGRESS NOTES
Hello,    Based on the preliminary results of your endoscopy it looks like he might have eosinophilic esophagitis which is an allergic response usually to something in your diet.  Definitely make an appointment to see me so we can talk this through seeing an allergist and doing a thorough elimination diet are the next steps as well as medicines for calming down the inflammation.    Mabel Wiggins MD

## 2018-09-14 ENCOUNTER — MYC REFILL (OUTPATIENT)
Dept: FAMILY MEDICINE | Facility: CLINIC | Age: 33
End: 2018-09-14

## 2018-09-14 DIAGNOSIS — F41.0 PANIC ATTACK: ICD-10-CM

## 2018-09-14 DIAGNOSIS — F32.0 MILD MAJOR DEPRESSION (H): ICD-10-CM

## 2018-09-14 NOTE — TELEPHONE ENCOUNTER
Message from MyChart:  Original authorizing provider: MD Nora Randall would like a refill of the following medications:  sertraline (ZOLOFT) 50 MG tablet [Mabel Wiggins MD]    Preferred pharmacy: [facility] [address] [place] [address] [address] AT [facility] [address]    Comment:

## 2018-09-28 ENCOUNTER — OFFICE VISIT (OUTPATIENT)
Dept: FAMILY MEDICINE | Facility: CLINIC | Age: 33
End: 2018-09-28
Payer: COMMERCIAL

## 2018-09-28 VITALS
DIASTOLIC BLOOD PRESSURE: 76 MMHG | HEART RATE: 73 BPM | OXYGEN SATURATION: 97 % | HEIGHT: 75 IN | SYSTOLIC BLOOD PRESSURE: 120 MMHG | TEMPERATURE: 98.6 F | BODY MASS INDEX: 20.65 KG/M2 | WEIGHT: 166.1 LBS

## 2018-09-28 DIAGNOSIS — K62.5 RECTAL BLEEDING: Primary | ICD-10-CM

## 2018-09-28 LAB
BASOPHILS # BLD AUTO: 0.1 10E9/L (ref 0–0.2)
BASOPHILS NFR BLD AUTO: 1.3 %
DIFFERENTIAL METHOD BLD: NORMAL
EOSINOPHIL # BLD AUTO: 0.3 10E9/L (ref 0–0.7)
EOSINOPHIL NFR BLD AUTO: 6 %
ERYTHROCYTE [DISTWIDTH] IN BLOOD BY AUTOMATED COUNT: 12.5 % (ref 10–15)
HCT VFR BLD AUTO: 41.9 % (ref 40–53)
HGB BLD-MCNC: 14.2 G/DL (ref 13.3–17.7)
LYMPHOCYTES # BLD AUTO: 1.4 10E9/L (ref 0.8–5.3)
LYMPHOCYTES NFR BLD AUTO: 30.6 %
MCH RBC QN AUTO: 30.1 PG (ref 26.5–33)
MCHC RBC AUTO-ENTMCNC: 33.9 G/DL (ref 31.5–36.5)
MCV RBC AUTO: 89 FL (ref 78–100)
MONOCYTES # BLD AUTO: 0.4 10E9/L (ref 0–1.3)
MONOCYTES NFR BLD AUTO: 8.5 %
NEUTROPHILS # BLD AUTO: 2.4 10E9/L (ref 1.6–8.3)
NEUTROPHILS NFR BLD AUTO: 53.6 %
PLATELET # BLD AUTO: 154 10E9/L (ref 150–450)
RBC # BLD AUTO: 4.72 10E12/L (ref 4.4–5.9)
WBC # BLD AUTO: 4.5 10E9/L (ref 4–11)

## 2018-09-28 PROCEDURE — 99214 OFFICE O/P EST MOD 30 MIN: CPT | Performed by: FAMILY MEDICINE

## 2018-09-28 PROCEDURE — 36415 COLL VENOUS BLD VENIPUNCTURE: CPT | Performed by: FAMILY MEDICINE

## 2018-09-28 PROCEDURE — 85025 COMPLETE CBC W/AUTO DIFF WBC: CPT | Performed by: FAMILY MEDICINE

## 2018-09-28 RX ORDER — DEXAMETHASONE 4 MG/1
TABLET ORAL
COMMUNITY
Start: 2018-09-24 | End: 2019-02-01

## 2018-09-28 NOTE — PROGRESS NOTES
SUBJECTIVE:   Nora Ayala is a 33 year old male who presents to clinic today for the following health issues:    Concern - Rectal problem  Onset: x 1 1/2 month     Description:   Intermittent   Pt state that he does not have to have a bowel movement for rectal to bleed. Pt state that he would notice after sitting on the toilet     Intensity: 0/10    Progression of Symptoms:  worsening    Accompanying Signs & Symptoms:  Denies fever, chills, nausea, headache, abdominal pain / discomfort     Previous history of similar problem:   No     Precipitating factors:   Worsened by: Nothing     Alleviating factors:  Improved by: Nothing   Therapies Tried and outcome: None   33-year-old who presents to clinic for evaluation of rectal bleeding.  The patient noticed intermittent episodes of bright red bleeding per rectum.  Episodes happen spontaneously not when he is defecating.  Denies any episodes of constipation.  He denies any fevers or chills.  Denies any back pain or abdominal discomfort.  Bleeding is approximately half a cup to one cup.  The patient was seen and evaluated a few years ago for similar episode and he was informed that he does not have hemorrhoids.  Denies any family history of gastrointestinal malignancies.  Also reports one episode of bleeding from his gums 1 week ago.  Denies any rashes.  Denies any recent anal intercourse.    Problem list and histories reviewed & adjusted, as indicated.  Additional history: as documented    Patient Active Problem List   Diagnosis     CARDIOVASCULAR SCREENING; LDL GOAL LESS THAN 160     Mild major depression (H)     Insomnia     Family history of cancer of digestive organ     External hemorrhoids     Panic attack     Moderate recurrent major depression (H)     Esophagitis, eosinophilic     Past Surgical History:   Procedure Laterality Date     ESOPHAGOSCOPY, GASTROSCOPY, DUODENOSCOPY (EGD), COMBINED N/A 7/18/2018    Procedure: COMBINED ESOPHAGOSCOPY, GASTROSCOPY,  "DUODENOSCOPY (EGD), BIOPSY SINGLE OR MULTIPLE;  EGD;  Surgeon: Jayme Montez MD;  Location:  GI       Social History   Substance Use Topics     Smoking status: Passive Smoke Exposure - Never Smoker     Smokeless tobacco: Never Used     Alcohol use 0.0 oz/week     0 Standard drinks or equivalent per week      Comment: occa- social      Family History   Problem Relation Age of Onset     Depression Mother      Gynecology Mother      Thyroid Disease Mother      Cancer Mother 59     Neurologic Disorder Father      MIGRAINE     Cancer Maternal Grandmother      LIVER CANCER     Allergies Other      SELF- CICLOR, AMOXICILLIN     Depression Other      SELF     Eye Disorder Other      SELF     Neurologic Disorder Other      SELF     Respiratory Other      SELF           Reviewed and updated as needed this visit by clinical staff  Tobacco  Allergies  Meds  Med Hx  Surg Hx  Fam Hx  Soc Hx      Reviewed and updated as needed this visit by Provider         ROS:  Constitutional, HEENT, cardiovascular, pulmonary, gi and gu systems are negative, except as otherwise noted.    OBJECTIVE:     /76 (BP Location: Left arm, Patient Position: Chair, Cuff Size: Adult Regular)  Pulse 73  Temp 98.6  F (37  C) (Oral)  Ht 6' 3\" (1.905 m)  Wt 166 lb 1.6 oz (75.3 kg)  SpO2 97%  BMI 20.76 kg/m2  Body mass index is 20.76 kg/(m^2).  GENERAL: healthy, alert and no distress  RESP: lungs clear to auscultation - no rales, rhonchi or wheezes  CV: regular rate and rhythm, normal S1 S2, no S3 or S4, no murmur, click or rub, no peripheral edema and peripheral pulses strong  RECTAL (male): normal sphincter tone, no rectal masses,  no rashes.  No internal or external hemorrhoids identified.  No anal fissures.    Diagnostic Test Results:  Results for orders placed or performed in visit on 09/28/18 (from the past 24 hour(s))   CBC with platelets differential   Result Value Ref Range    WBC 4.5 4.0 - 11.0 10e9/L    RBC Count 4.72 4.4 " - 5.9 10e12/L    Hemoglobin 14.2 13.3 - 17.7 g/dL    Hematocrit 41.9 40.0 - 53.0 %    MCV 89 78 - 100 fl    MCH 30.1 26.5 - 33.0 pg    MCHC 33.9 31.5 - 36.5 g/dL    RDW 12.5 10.0 - 15.0 %    Platelet Count 154 150 - 450 10e9/L    Diff Method Automated Method     % Neutrophils 53.6 %    % Lymphocytes 30.6 %    % Monocytes 8.5 %    % Eosinophils 6.0 %    % Basophils 1.3 %    Absolute Neutrophil 2.4 1.6 - 8.3 10e9/L    Absolute Lymphocytes 1.4 0.8 - 5.3 10e9/L    Absolute Monocytes 0.4 0.0 - 1.3 10e9/L    Absolute Eosinophils 0.3 0.0 - 0.7 10e9/L    Absolute Basophils 0.1 0.0 - 0.2 10e9/L       ASSESSMENT/PLAN:   1. Rectal bleeding  Assessment: Differential diagnosis for rectal bleeding includes anal fissure, internal or external hemorrhoids, rectal mass, vascular malformation, polyps or other more serious gastrointestinal bleeding including colon cancer, Crohn's disease or ulcerative colitis. Small amount of bright bleeding bleeding  pinpoints the bleeding as due to a rectal hemorrhoids.   Although I was not able to observe any external or internal hemorrhoids in today's exam, he was informed that there is a possibility of internal hemorrhoids that we were not able to see in today's visit.    Plan:  - Bulk forming laxatives: Metamucil, Citrucel and Benefiber  - Advised to monitor symptoms and schedule an appointment with gastroenterology for evaluation if he continues to experience spontaneous severe bleeding per rectum, weight changes or abdominal pain.  - GASTROENTEROLOGY ADULT REF PROCEDURE ONLY  - CBC with platelets differential      Ivan Paz MD  [facility]

## 2018-09-28 NOTE — PATIENT INSTRUCTIONS
Understanding Rectal Bleeding    Rectal bleeding is when blood passes through your rectum and anus. It can happen with or without a bowel movement. Rectal bleeding may be a sign of a serious problem in your rectum, colon, or upper GI tract. Call your healthcare provider right away if you have any rectal bleeding.  The GI Tract  The gastrointestinal (GI) tract includes the mouth, esophagus, stomach, small intestine, large intestine (colon), rectum, and anus. The food you eat is digested as it passes through the GI tract. Solid waste leaves the body through the rectum.   Rectal bleeding and GI problems  The cause of rectal bleeding may be found in any region of the GI tract. The colon or rectum may be the site of your bleeding problem. Or, bleeding may be due to problems farther up the GI tract, such as in the small intestine, duodenum, or stomach.  Causes of rectal bleeding  Rectal bleeding causes include the following:    Hemorrhoids (swollen veins in the rectum and anus)    Fissures (tears in or near the anus)    Diverticulosis (inflamed pockets in the colon wall)    Infection    Ischemia (low blood flow)    Radiation damage    Inflammatory bowel disease (Crohn's disease or ulcerative colitis)    Ulcers in the upper GI tract and inflammation of the large intestine    Abnormal tissue growths (tumors or polyps) in the GI tract    A bulging rectum (also called a rectal prolapse)    Abnormal blood vessels in the small intestine or in the colon  Common symptoms  Common symptoms include the following:    Rectal pain, itching, or soreness    Belly pain or epigastric pain    Minor occasional drops of blood that appear on the stool or toilet paper, to greater amounts of stool that appear black or tarry   Rectal bleeding can also happen without pain.  Date Last Reviewed: 7/1/2016 2000-2017 [employer]. [address] [place] [id number]. All rights reserved. This information is not intended as a  substitute for professional medical care. Always follow your healthcare professional's instructions.

## 2018-09-28 NOTE — MR AVS SNAPSHOT
After Visit Summary   9/28/2018    Nora Ayala    MRN: [id number]           Patient Information     Date Of Birth          1985        Visit Information        Provider Department      9/28/2018 8:20 AM Ivan Paz MD [facility]        Today's Diagnoses     Rectal bleeding    -  1      Care Instructions      Understanding Rectal Bleeding    Rectal bleeding is when blood passes through your rectum and anus. It can happen with or without a bowel movement. Rectal bleeding may be a sign of a serious problem in your rectum, colon, or upper GI tract. Call your healthcare provider right away if you have any rectal bleeding.  The GI Tract  The gastrointestinal (GI) tract includes the mouth, esophagus, stomach, small intestine, large intestine (colon), rectum, and anus. The food you eat is digested as it passes through the GI tract. Solid waste leaves the body through the rectum.   Rectal bleeding and GI problems  The cause of rectal bleeding may be found in any region of the GI tract. The colon or rectum may be the site of your bleeding problem. Or, bleeding may be due to problems farther up the GI tract, such as in the small intestine, duodenum, or stomach.  Causes of rectal bleeding  Rectal bleeding causes include the following:    Hemorrhoids (swollen veins in the rectum and anus)    Fissures (tears in or near the anus)    Diverticulosis (inflamed pockets in the colon wall)    Infection    Ischemia (low blood flow)    Radiation damage    Inflammatory bowel disease (Crohn's disease or ulcerative colitis)    Ulcers in the upper GI tract and inflammation of the large intestine    Abnormal tissue growths (tumors or polyps) in the GI tract    A bulging rectum (also called a rectal prolapse)    Abnormal blood vessels in the small intestine or in the colon  Common symptoms  Common symptoms include the following:    Rectal pain, itching, or soreness    Belly pain or epigastric  pain    Minor occasional drops of blood that appear on the stool or toilet paper, to greater amounts of stool that appear black or tarry   Rectal bleeding can also happen without pain.  Date Last Reviewed: 7/1/2016 2000-2017 [employer]. [address] [place] [address], [place] [address]. All rights reserved. This information is not intended as a substitute for professional medical care. Always follow your healthcare professional's instructions.                Follow-ups after your visit        Additional Services     GASTROENTEROLOGY ADULT REF PROCEDURE ONLY       Last Lab Result: Creatinine (mg/dL)       Date                     Value                 06/14/2017               1.15             ----------  Body mass index is 20.76 kg/(m^2).      Patient will be contacted to schedule procedure.     Please be aware that coverage of these services is subject to the terms and limitations of your health insurance plan.  Call member services at your health plan with any benefit or coverage questions.  Any procedures must be performed at a [facility] facility OR coordinated by your clinic's referral office.    Please bring the following with you to your appointment:    (1) Any X-Rays, CTs or MRIs which have been performed.  Contact the facility where they were done to arrange for  prior to your scheduled appointment.    (2) List of current medications   (3) This referral request   (4) Any documents/labs given to you for this referral                  Who to contact     If you have questions or need follow up information about today's clinic visit or your schedule please contact [facility] directly at [phone].  Normal or non-critical lab and imaging results will be communicated to you by MyChart, letter or phone within 4 business days after the clinic has received the results. If you do not hear from us within 7 days, please contact the clinic through MyChart or phone. If you have a critical or  "abnormal lab result, we will notify you by phone as soon as possible.  Submit refill requests through [employer] or call your pharmacy and they will forward the refill request to us. Please allow 3 business days for your refill to be completed.          Additional Information About Your Visit        [employer] Information     [employer] gives you secure access to your electronic health record. If you see a primary care provider, you can also send messages to your care team and make appointments. If you have questions, please call your primary care clinic.  If you do not have a primary care provider, please call [phone] and they will assist you.        Care EveryWhere ID     This is your Care EveryWhere ID. This could be used by other organizations to access your [facility] medical records  [id number]        Your Vitals Were     Pulse Temperature Height Pulse Oximetry BMI (Body Mass Index)       73 98.6  F (37  C) (Oral) 6' 3\" (1.905 m) 97% 20.76 kg/m2        Blood Pressure from Last 3 Encounters:   09/28/18 120/76   07/18/18 104/60   06/07/18 109/64    Weight from Last 3 Encounters:   09/28/18 166 lb 1.6 oz (75.3 kg)   07/18/18 165 lb (74.8 kg)   06/07/18 162 lb 8 oz (73.7 kg)              We Performed the Following     CBC with platelets differential     GASTROENTEROLOGY ADULT REF PROCEDURE ONLY        Primary Care Provider Office Phone # Fax #    Mabel Wiggins [phone] [address] [address]        Equal Access to Services     AUGUSTINE [facility] : Hadii aad ku hadasho Soomaali, waaxda luqadaha, qaybta kaalmada adeegron, patricia asif . So [facility] [phone].    ATENCIÓN: Si habla español, tiene a ho disposición servicios gratuitos de asistencia lingüística. Llame al [phone].    We comply with applicable federal civil rights laws and [place] laws. We do not discriminate on the basis of race, color, national origin, age, disability, " sex, sexual orientation, or gender identity.            Thank you!     Thank you for choosing [facility]  for your care. Our goal is always to provide you with excellent care. Hearing back from our patients is one way we can continue to improve our services. Please take a few minutes to complete the written survey that you may receive in the mail after your visit with us. Thank you!             Your Updated Medication List - Protect others around you: Learn how to safely use, store and throw away your medicines at www.disposemymeds.org.          This list is accurate as of 9/28/18  8:46 AM.  Always use your most recent med list.                   Brand Name Dispense Instructions for use Diagnosis    FLOVENT  MCG/ACT Inhaler   Generic drug:  fluticasone           LORazepam 1 MG tablet    ATIVAN    20 tablet    Take 0.5 tablets (0.5 mg) by mouth 2 times daily as needed for anxiety (or panic)    Major depressive disorder, recurrent episode, mild (H)       propranolol 20 MG tablet    INDERAL    60 tablet    Take 1 tablet (20 mg) by mouth 2 times daily For performance anxiety at work    Major depressive disorder, recurrent episode, mild (H)       sertraline 50 MG tablet    ZOLOFT    90 tablet    Take 1 tablet (50 mg) by mouth daily    Panic attack, Mild major depression (H)       traZODone 50 MG tablet    DESYREL    30 tablet    TAKE 1 TABLET BY MOUTH EVERY NIGHT AT BEDTIME AS NEEDED FOR SLEEP    Primary insomnia

## 2018-12-10 DIAGNOSIS — F51.01 PRIMARY INSOMNIA: ICD-10-CM

## 2018-12-10 RX ORDER — TRAZODONE HYDROCHLORIDE 50 MG/1
TABLET, FILM COATED ORAL
Qty: 30 TABLET | Refills: 0 | Status: SHIPPED | OUTPATIENT
Start: 2018-12-10 | End: 2019-01-11

## 2019-01-09 ENCOUNTER — TRANSFERRED RECORDS (OUTPATIENT)
Dept: HEALTH INFORMATION MANAGEMENT | Facility: CLINIC | Age: 34
End: 2019-01-09

## 2019-01-11 DIAGNOSIS — F51.01 PRIMARY INSOMNIA: ICD-10-CM

## 2019-01-11 RX ORDER — TRAZODONE HYDROCHLORIDE 50 MG/1
TABLET, FILM COATED ORAL
Qty: 30 TABLET | Refills: 0 | Status: SHIPPED | OUTPATIENT
Start: 2019-01-11 | End: 2019-02-01

## 2019-01-11 NOTE — TELEPHONE ENCOUNTER
Trazodone  Patient given one month supply 12/10/18.  Due for physical.  Last 6/14/17    Spoke with patient.  Scheduled patient to see JS for physical 1/25    Refill sent to pharmacy for one month supply.  Olesya Banerjee RN

## 2019-02-01 ENCOUNTER — OFFICE VISIT (OUTPATIENT)
Dept: FAMILY MEDICINE | Facility: CLINIC | Age: 34
End: 2019-02-01
Payer: COMMERCIAL

## 2019-02-01 VITALS
DIASTOLIC BLOOD PRESSURE: 75 MMHG | SYSTOLIC BLOOD PRESSURE: 118 MMHG | WEIGHT: 170.2 LBS | BODY MASS INDEX: 21.16 KG/M2 | TEMPERATURE: 98.4 F | HEART RATE: 76 BPM | RESPIRATION RATE: 16 BRPM | HEIGHT: 75 IN | OXYGEN SATURATION: 100 %

## 2019-02-01 DIAGNOSIS — F41.0 PANIC ATTACK: ICD-10-CM

## 2019-02-01 DIAGNOSIS — F51.01 PRIMARY INSOMNIA: ICD-10-CM

## 2019-02-01 DIAGNOSIS — Z23 NEED FOR VACCINATION: ICD-10-CM

## 2019-02-01 DIAGNOSIS — F32.0 MILD MAJOR DEPRESSION (H): ICD-10-CM

## 2019-02-01 DIAGNOSIS — K20.0 ESOPHAGITIS, EOSINOPHILIC: ICD-10-CM

## 2019-02-01 DIAGNOSIS — Z00.00 ROUTINE GENERAL MEDICAL EXAMINATION AT A HEALTH CARE FACILITY: Primary | ICD-10-CM

## 2019-02-01 PROCEDURE — 90471 IMMUNIZATION ADMIN: CPT | Performed by: PHYSICIAN ASSISTANT

## 2019-02-01 PROCEDURE — 90686 IIV4 VACC NO PRSV 0.5 ML IM: CPT | Performed by: PHYSICIAN ASSISTANT

## 2019-02-01 PROCEDURE — 99395 PREV VISIT EST AGE 18-39: CPT | Mod: 25 | Performed by: PHYSICIAN ASSISTANT

## 2019-02-01 PROCEDURE — 99214 OFFICE O/P EST MOD 30 MIN: CPT | Mod: 25 | Performed by: PHYSICIAN ASSISTANT

## 2019-02-01 RX ORDER — PANTOPRAZOLE SODIUM 40 MG/1
TABLET, DELAYED RELEASE ORAL
Refills: 3 | COMMUNITY
Start: 2019-01-09

## 2019-02-01 RX ORDER — TRAZODONE HYDROCHLORIDE 50 MG/1
50 TABLET, FILM COATED ORAL
Qty: 30 TABLET | Refills: 5 | Status: SHIPPED | OUTPATIENT
Start: 2019-02-01 | End: 2019-08-07

## 2019-02-01 RX ORDER — LORAZEPAM 1 MG/1
0.5 TABLET ORAL 2 TIMES DAILY PRN
Qty: 20 TABLET | Refills: 1 | Status: SHIPPED | OUTPATIENT
Start: 2019-02-01 | End: 2020-04-10

## 2019-02-01 ASSESSMENT — PATIENT HEALTH QUESTIONNAIRE - PHQ9: SUM OF ALL RESPONSES TO PHQ QUESTIONS 1-9: 9

## 2019-02-01 ASSESSMENT — MIFFLIN-ST. JEOR: SCORE: 1802.65
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
## 2019-02-11 DIAGNOSIS — F51.01 PRIMARY INSOMNIA: ICD-10-CM

## 2019-02-12 NOTE — TELEPHONE ENCOUNTER
Trazodone      Last Written Prescription Date:  2-1-2019  Last Fill Quantity: 30,   # refills: 5  Last Office Visit: 2-1-19  Future Office visit:       Routing refill request to provider for review/approval because:  Drug not on the [facility], P or [facility] refill protocol or controlled substance

## 2019-02-13 RX ORDER — TRAZODONE HYDROCHLORIDE 50 MG/1
TABLET, FILM COATED ORAL
Start: 2019-02-13

## 2019-04-05 DIAGNOSIS — F41.0 PANIC ATTACK: ICD-10-CM

## 2019-04-05 DIAGNOSIS — F32.0 MILD MAJOR DEPRESSION (H): ICD-10-CM

## 2019-04-08 NOTE — TELEPHONE ENCOUNTER
"Denied  Too early; Rx sent 2/1/2019 for 1 year  Ibis KING RN    Last Written Prescription Date:  2/1/2019  Last Fill Quantity: 90,  # refills: 3   Last office visit: 2/1/2019 with prescribing provider:     Future Office Visit:    Requested Prescriptions   Pending Prescriptions Disp Refills     sertraline (ZOLOFT) 50 MG tablet [Pharmacy Med Name: SERTRALINE 50MG TABLETS] 90 tablet 0     Sig: TAKE 1 TABLET(50 MG) BY MOUTH DAILY       SSRIs Protocol Failed - 4/5/2019 11:10 AM        Failed - PHQ-9 score less than 5 in past 6 months     Please review last PHQ-9 score.           Passed - Medication is active on med list        Passed - Patient is age 18 or older        Passed - Recent (6 mo) or future (30 days) visit within the authorizing provider's specialty     Patient had office visit in the last 6 months or has a visit in the next 30 days with authorizing provider or within the authorizing provider's specialty.  See \"Patient Info\" tab in inbasket, or \"Choose Columns\" in Meds & Orders section of the refill encounter.               "

## 2019-04-16 ENCOUNTER — OFFICE VISIT (OUTPATIENT)
Dept: FAMILY MEDICINE | Facility: CLINIC | Age: 34
End: 2019-04-16
Payer: COMMERCIAL

## 2019-04-16 VITALS
HEART RATE: 75 BPM | TEMPERATURE: 98.2 F | BODY MASS INDEX: 20.51 KG/M2 | OXYGEN SATURATION: 97 % | WEIGHT: 165 LBS | SYSTOLIC BLOOD PRESSURE: 137 MMHG | HEIGHT: 75 IN | DIASTOLIC BLOOD PRESSURE: 85 MMHG

## 2019-04-16 DIAGNOSIS — R23.8 VESICULAR RASH: Primary | ICD-10-CM

## 2019-04-16 PROCEDURE — 36415 COLL VENOUS BLD VENIPUNCTURE: CPT | Performed by: PHYSICIAN ASSISTANT

## 2019-04-16 PROCEDURE — 87798 DETECT AGENT NOS DNA AMP: CPT | Performed by: PHYSICIAN ASSISTANT

## 2019-04-16 PROCEDURE — 99213 OFFICE O/P EST LOW 20 MIN: CPT | Performed by: PHYSICIAN ASSISTANT

## 2019-04-16 RX ORDER — VALACYCLOVIR HYDROCHLORIDE 1 G/1
1000 TABLET, FILM COATED ORAL 3 TIMES DAILY
Qty: 21 TABLET | Refills: 0 | Status: SHIPPED | OUTPATIENT
Start: 2019-04-16 | End: 2019-04-23

## 2019-04-16 ASSESSMENT — MIFFLIN-ST. JEOR: SCORE: 1779.07

## 2019-04-16 NOTE — PROGRESS NOTES
"z  SUBJECTIVE:   Nora Ayala is a 33 year old male who presents to clinic today for the following   health issues:      Rash      Duration: 2 days    Description  Location: forehead  Itching: no    Intensity:  moderate    Accompanying signs and symptoms: tender    History (similar episodes/previous evaluation): None    Precipitating or alleviating factors:  New exposures:  None  Recent travel: no      Therapies tried and outcome: none          Additional history: he has been dealing with a headache for the last few days, did not think too much of it as it has felt like any other.  2 days ago, noticed a faint rash over left sided forehead that is a bit more sensitive to touch.  He did have chicken pox as kid, concerned about shingles.    Reviewed  and updated as needed this visit by clinical staff  Tobacco  Allergies  Meds         Reviewed and updated as needed this visit by Provider         BP Readings from Last 3 Encounters:   04/16/19 137/85   02/01/19 118/75   09/28/18 120/76    Wt Readings from Last 3 Encounters:   04/16/19 74.8 kg (165 lb)   02/01/19 77.2 kg (170 lb 3.2 oz)   09/28/18 75.3 kg (166 lb 1.6 oz)                    ROS:  Constitutional, HEENT, cardiovascular, pulmonary, gi and gu systems are negative, except as otherwise noted.    OBJECTIVE:     /85 (BP Location: Right arm, Cuff Size: Adult Regular)   Pulse 75   Temp 98.2  F (36.8  C) (Oral)   Ht 1.905 m (6' 3\")   Wt 74.8 kg (165 lb)   SpO2 97%   BMI 20.62 kg/m    Body mass index is 20.62 kg/m .  GENERAL: alert and no distress  EYES: Eyes grossly normal to inspection  NECK: no adenopathy, no asymmetry, masses, or scars and thyroid normal to palpation  RESP: lungs clear to auscultation - no rales, rhonchi or wheezes  CV: regular rate and rhythm, normal S1 S2, no S3 or S4, no murmur, click or rub, no peripheral edema and peripheral pulses strong  SKIN: a couple of small erythematous vesicles around left temple/hairline.  PSYCH: " mentation appears normal, affect normal/bright    Diagnostic Test Results:  none     ASSESSMENT/PLAN:             1. Vesicular rash  Will start treatment based on history and exam, culture taken to confirm diagnosis.    - valACYclovir (VALTREX) 1000 mg tablet; Take 1 tablet (1,000 mg) by mouth 3 times daily for 7 days  Dispense: 21 tablet; Refill: 0  - Varicella Zoster DNA PCR CSF or Skin Swab        Nicholas Nagel PA-C  [facility]

## 2019-04-16 NOTE — NURSING NOTE
"Chief Complaint   Patient presents with     Derm Problem     blemish on forehead for 2 days and a headache, wonders if he has shingles     initial /85 (BP Location: Right arm, Cuff Size: Adult Regular)   Pulse 75   Temp 98.2  F (36.8  C) (Oral)   Ht 1.905 m (6' 3\")   Wt 74.8 kg (165 lb)   SpO2 97%   BMI 20.62 kg/m   Estimated body mass index is 20.62 kg/m  as calculated from the following:    Height as of this encounter: 1.905 m (6' 3\").    Weight as of this encounter: 74.8 kg (165 lb).  BP completed using cuff size: regular.   R arm      Health Maintenance that is potentially due pending provider review:  NONE    n/a    Emmett Milian ma  "

## 2019-04-17 LAB
SPECIMEN TYPE: NORMAL
VARICELLA ZOSTER DNA PCR COMMENT: NORMAL
VZV DNA SPEC QL NAA+PROBE: NORMAL

## 2019-04-18 NOTE — RESULT ENCOUNTER NOTE
Dear Nora    Your test results are attached, feel free to contact me via [facility]     It does not appear that rash is from shingles, or at least it was such a small reaction we could not culture it properly.  How have you symptoms been?    Israel Nagel PA-C

## 2019-08-07 DIAGNOSIS — F51.01 PRIMARY INSOMNIA: ICD-10-CM

## 2019-08-07 RX ORDER — TRAZODONE HYDROCHLORIDE 50 MG/1
50 TABLET, FILM COATED ORAL
Qty: 30 TABLET | Refills: 5 | Status: SHIPPED | OUTPATIENT
Start: 2019-08-07 | End: 2020-02-04

## 2019-08-07 NOTE — TELEPHONE ENCOUNTER
"Trazodone 50MG   Last Written Prescription Date:  02/01/2019  Last Fill Quantity: 30,  # refills: 5   Last office visit: 4/16/2019 with prescribing provider:  Slime, last office visit with prescribing provider was on 06/07/2018   Future Office Visit:      Requested Prescriptions   Pending Prescriptions Disp Refills     traZODone (DESYREL) 50 MG tablet 30 tablet 5     Sig: Take 1 tablet (50 mg) by mouth nightly as needed       Serotonin Modulators Passed - 8/7/2019  8:09 AM        Passed - Recent (12 mo) or future (30 days) visit within the authorizing provider's specialty     Patient had office visit in the last 12 months or has a visit in the next 30 days with authorizing provider or within the authorizing provider's specialty.  See \"Patient Info\" tab in inbasket, or \"Choose Columns\" in Meds & Orders section of the refill encounter.              Passed - Medication is active on med list        Passed - Patient is age 18 or older          "

## 2019-08-07 NOTE — TELEPHONE ENCOUNTER
Prescription approved per [facility] Refill Protocol.  Ibis KING RN

## 2020-02-04 ENCOUNTER — MYC REFILL (OUTPATIENT)
Dept: FAMILY MEDICINE | Facility: CLINIC | Age: 35
End: 2020-02-04

## 2020-02-04 DIAGNOSIS — F41.0 PANIC ATTACK: ICD-10-CM

## 2020-02-04 DIAGNOSIS — F32.0 MILD MAJOR DEPRESSION (H): ICD-10-CM

## 2020-02-04 NOTE — TELEPHONE ENCOUNTER
Last Written Prescription Date:  2/1/2019  Last Fill Quantity: 90,  # refills: 3   Last office visit: 4/16/2019 with prescribing provider:  Yes, rash only  Due for physical 2/1/19  Future Office Visit:      Pharmacy in [place].  Olesya Banejree RN

## 2020-02-05 DIAGNOSIS — F51.01 PRIMARY INSOMNIA: ICD-10-CM

## 2020-02-06 RX ORDER — TRAZODONE HYDROCHLORIDE 50 MG/1
TABLET, FILM COATED ORAL
Start: 2020-02-06

## 2020-02-06 NOTE — TELEPHONE ENCOUNTER
See other refill encounter  Pt did move  Needed 1x Rx until sees new PCP  Prescription approved per [facility] Refill Protocol.  Ibis KING RN

## 2020-02-06 NOTE — TELEPHONE ENCOUNTER
"Denied  Rx sent to different [facility] in [place] today  Ibis KNIG RN    Last Written Prescription Date:  2/6/2020  Last Fill Quantity: 30,  # refills: 0   Last office visit: 4/16/2019 with prescribing provider:     Future Office Visit:    Requested Prescriptions   Pending Prescriptions Disp Refills     traZODone (DESYREL) 50 MG tablet [Pharmacy Med Name: TRAZODONE 50MG TABLETS] 30 tablet 5     Sig: TAKE 1 TABLET(50 MG) BY MOUTH EVERY NIGHT AS NEEDED       Serotonin Modulators Passed - 2/5/2020  6:27 PM        Passed - Recent (12 mo) or future (30 days) visit within the authorizing provider's specialty     Patient has had an office visit with the authorizing provider or a provider within the authorizing providers department within the previous 12 mos or has a future within next 30 days. See \"Patient Info\" tab in inbasket, or \"Choose Columns\" in Meds & Orders section of the refill encounter.              Passed - Medication is active on med list        Passed - Patient is age 18 or older        "

## 2020-03-01 ENCOUNTER — HEALTH MAINTENANCE LETTER (OUTPATIENT)
Age: 35
End: 2020-03-01

## 2020-04-10 ENCOUNTER — VIRTUAL VISIT (OUTPATIENT)
Dept: FAMILY MEDICINE | Facility: CLINIC | Age: 35
End: 2020-04-10
Payer: COMMERCIAL

## 2020-04-10 DIAGNOSIS — F33.1 MODERATE RECURRENT MAJOR DEPRESSION (H): Primary | ICD-10-CM

## 2020-04-10 DIAGNOSIS — F51.01 PRIMARY INSOMNIA: ICD-10-CM

## 2020-04-10 DIAGNOSIS — F41.0 PANIC ATTACK: ICD-10-CM

## 2020-04-10 PROCEDURE — 96127 BRIEF EMOTIONAL/BEHAV ASSMT: CPT | Mod: TEL | Performed by: PHYSICIAN ASSISTANT

## 2020-04-10 PROCEDURE — 96127 BRIEF EMOTIONAL/BEHAV ASSMT: CPT | Mod: 59 | Performed by: PHYSICIAN ASSISTANT

## 2020-04-10 PROCEDURE — 99214 OFFICE O/P EST MOD 30 MIN: CPT | Mod: TEL | Performed by: PHYSICIAN ASSISTANT

## 2020-04-10 RX ORDER — LORAZEPAM 1 MG/1
0.5 TABLET ORAL 2 TIMES DAILY PRN
Qty: 20 TABLET | Refills: 1 | Status: SHIPPED | OUTPATIENT
Start: 2020-04-10

## 2020-04-10 RX ORDER — TRAZODONE HYDROCHLORIDE 50 MG/1
50 TABLET, FILM COATED ORAL
Qty: 30 TABLET | Refills: 0 | Status: SHIPPED | OUTPATIENT
Start: 2020-04-10 | End: 2020-05-12

## 2020-04-10 ASSESSMENT — ANXIETY QUESTIONNAIRES
2. NOT BEING ABLE TO STOP OR CONTROL WORRYING: MORE THAN HALF THE DAYS
7. FEELING AFRAID AS IF SOMETHING AWFUL MIGHT HAPPEN: SEVERAL DAYS
6. BECOMING EASILY ANNOYED OR IRRITABLE: SEVERAL DAYS
GAD7 TOTAL SCORE: 9
3. WORRYING TOO MUCH ABOUT DIFFERENT THINGS: MORE THAN HALF THE DAYS
1. FEELING NERVOUS, ANXIOUS, OR ON EDGE: MORE THAN HALF THE DAYS
IF YOU CHECKED OFF ANY PROBLEMS ON THIS QUESTIONNAIRE, HOW DIFFICULT HAVE THESE PROBLEMS MADE IT FOR YOU TO DO YOUR WORK, TAKE CARE OF THINGS AT HOME, OR GET ALONG WITH OTHER PEOPLE: NOT DIFFICULT AT ALL
5. BEING SO RESTLESS THAT IT IS HARD TO SIT STILL: NOT AT ALL

## 2020-04-10 ASSESSMENT — PATIENT HEALTH QUESTIONNAIRE - PHQ9
SUM OF ALL RESPONSES TO PHQ QUESTIONS 1-9: 11
5. POOR APPETITE OR OVEREATING: SEVERAL DAYS

## 2020-04-10 NOTE — PROGRESS NOTES
"Malachi Ayala is a 34 year old male who is being evaluated via a billable telephone visit.      The patient has been notified of following:     \"This telephone visit will be conducted via a call between you and your physician/provider. We have found that certain health care needs can be provided without the need for a physical exam.  This service lets us provide the care you need with a short phone conversation.  If a prescription is necessary we can send it directly to your pharmacy.  If lab work is needed we can place an order for that and you can then stop by our lab to have the test done at a later time.    Telephone visits are billed at different rates depending on your insurance coverage. During this emergency period, for some insurers they may be billed the same as an in-person visit.  Please reach out to your insurance provider with any questions.    If during the course of the call the physician/provider feels a telephone visit is not appropriate, you will not be charged for this service.\"    Patient has given verbal consent for Telephone visit?  Yes    How would you like to obtain your AVS? Perez Ayala complains of   Chief Complaint   Patient presents with     Refill Request     Anxiety       ALLERGIES  Amoxicillin; Ciclopirox; and Tree nuts [nuts]    Anxiety Follow-Up    How are you doing with your anxiety since your last visit? Generally stayed then same some situational anxiety     Are you having other symptoms that might be associated with anxiety? Yes:  acid relfex -- one month patients eye was red     Have you had a significant life event? OTHER: moved [facility] in the [address] [place] [address] 8 months ago divoced, and within the last 4 [employer] chnaged employment     Are you feeling depressed? Yes:  notied it more so now    Do you have any concerns with your use of alcohol or other drugs? Yes:  sometimes concerns on drinking    Social History     Tobacco Use     Smoking status: Passive Smoke " Exposure - Never Smoker     Smokeless tobacco: Never Used   Substance Use Topics     Alcohol use: Yes     Alcohol/week: 0.0 standard drinks     Comment: occa- social      Drug use: No     NADIA-7 SCORE 1/6/2017 3/26/2018 4/10/2020   Total Score - - -   Total Score - 12 (moderate anxiety) -   Total Score 15 12 9     PHQ 3/26/2018 2/1/2019 4/10/2020   PHQ-9 Total Score 7 9 11   Q9: Thoughts of better off dead/self-harm past 2 weeks Not at all Not at all Several days     Last PHQ-9 4/10/2020   1.  Little interest or pleasure in doing things 2   2.  Feeling down, depressed, or hopeless 1   3.  Trouble falling or staying asleep, or sleeping too much 0   4.  Feeling tired or having little energy 2   5.  Poor appetite or overeating 0   6.  Feeling bad about yourself 2   7.  Trouble concentrating 2   8.  Moving slowly or restless 1   Q9: Thoughts of better off dead/self-harm past 2 weeks 1   PHQ-9 Total Score 11   Difficulty at work, home, or with people Not difficult at all     NADIA-7  4/10/2020   1. Feeling nervous, anxious, or on edge 2   2. Not being able to stop or control worrying 2   3. Worrying too much about different things 2   4. Trouble relaxing 1   5. Being so restless that it is hard to sit still 0   6. Becoming easily annoyed or irritable 1   7. Feeling afraid, as if something awful might happen 1   NADIA-7 Total Score 9   If you checked any problems, how difficult have they made it for you to do your work, take care of things at home, or get along with other people? Not difficult at all         How many servings of fruits and vegetables do you eat daily?  0-1 getting better    On average, how many sweetened beverages do you drink each day (Examples: soda, juice, sweet tea, etc.  Do NOT count diet or artificially sweetened beverages)?   0    How many days per week do you exercise enough to make your heart beat faster? 4     How many minutes a day do you exercise enough to make your heart beat faster? 20 -  29    How many days per week do you miss taking your medication? 0    His sleep has also been a problem, uses trazodone, and that is successful.  He has tried to go off of, but has not been able to at this point.        BP Readings from Last 3 Encounters:   04/16/19 137/85   02/01/19 118/75   09/28/18 120/76    Wt Readings from Last 3 Encounters:   04/16/19 74.8 kg (165 lb)   02/01/19 77.2 kg (170 lb 3.2 oz)   09/28/18 75.3 kg (166 lb 1.6 oz)                    Reviewed and updated as needed this visit by Provider         Review of Systems   ROS COMP: Constitutional, HEENT, cardiovascular, pulmonary, gi and gu systems are negative, except as otherwise noted.       Objective   Reported vitals:  There were no vitals taken for this visit.   alert and no distress  Psych: Alert and oriented times 3; coherent speech, normal   rate and volume, able to articulate logical thoughts, able   to abstract reason, no tangential thoughts, no hallucinations   or delusions       Diagnostic Test Results:  Labs reviewed in Epic        Assessment/Plan:  1. Moderate recurrent major depression (H)  Overall things are stable, possibly slightly improved in general.  Has gotten a few panic attacks, but uses very prn ativan.  Will refill.  - EMOTIONAL / BEHAVIORAL ASSESSMENT    2. Panic attack    - EMOTIONAL / BEHAVIORAL ASSESSMENT  - sertraline (ZOLOFT) 50 MG tablet; Take 1 tablet (50 mg) by mouth daily  Dispense: 30 tablet; Refill: 0  - LORazepam (ATIVAN) 1 MG tablet; Take 0.5 tablets (0.5 mg) by mouth 2 times daily as needed for anxiety (or panic)  Dispense: 20 tablet; Refill: 1    3. Primary insomnia  Stable, working well without side effects.  - traZODone (DESYREL) 50 MG tablet; Take 1 tablet (50 mg) by mouth nightly as needed for sleep  Dispense: 30 tablet; Refill: 0    Return in about 6 months (around 10/10/2020).      Phone call duration:  9 minutes    Nicholas Nagel PA-C

## 2020-04-11 ASSESSMENT — ANXIETY QUESTIONNAIRES: GAD7 TOTAL SCORE: 9

## 2020-09-03 ENCOUNTER — MYC MEDICAL ADVICE (OUTPATIENT)
Dept: FAMILY MEDICINE | Facility: CLINIC | Age: 35
End: 2020-09-03

## 2020-11-16 ASSESSMENT — PATIENT HEALTH QUESTIONNAIRE - PHQ9
SUM OF ALL RESPONSES TO PHQ QUESTIONS 1-9: 8
SUM OF ALL RESPONSES TO PHQ QUESTIONS 1-9: 8
10. IF YOU CHECKED OFF ANY PROBLEMS, HOW DIFFICULT HAVE THESE PROBLEMS MADE IT FOR YOU TO DO YOUR WORK, TAKE CARE OF THINGS AT HOME, OR GET ALONG WITH OTHER PEOPLE: SOMEWHAT DIFFICULT

## 2020-11-17 ASSESSMENT — PATIENT HEALTH QUESTIONNAIRE - PHQ9: SUM OF ALL RESPONSES TO PHQ QUESTIONS 1-9: 8

## 2020-12-14 ENCOUNTER — HEALTH MAINTENANCE LETTER (OUTPATIENT)
Age: 35
End: 2020-12-14

## 2020-12-16 ENCOUNTER — MYC REFILL (OUTPATIENT)
Dept: FAMILY MEDICINE | Facility: CLINIC | Age: 35
End: 2020-12-16

## 2020-12-16 DIAGNOSIS — F41.0 PANIC ATTACK: ICD-10-CM

## 2020-12-17 NOTE — TELEPHONE ENCOUNTER
JS,   See below MyChart from pt   In [place] per pharmacy and previous messages  Will establish with new PCP there  Ok to refill again for another 30 days?  Ibis KING RN

## 2021-02-02 ENCOUNTER — MYC REFILL (OUTPATIENT)
Dept: FAMILY MEDICINE | Facility: CLINIC | Age: 36
End: 2021-02-02

## 2021-02-02 DIAGNOSIS — F51.01 PRIMARY INSOMNIA: ICD-10-CM

## 2021-02-02 RX ORDER — TRAZODONE HYDROCHLORIDE 50 MG/1
50 TABLET, FILM COATED ORAL
Qty: 90 TABLET | Refills: 0 | Status: SHIPPED | OUTPATIENT
Start: 2021-02-02 | End: 2021-05-07

## 2021-02-02 NOTE — TELEPHONE ENCOUNTER
JS  Pt calling in requesting one more refill of the trazodone.  Says he had an appointment with a provider in [place] but got COVID and had to reschedule for March 2.    trazodone      Last Written Prescription Date:  11/9/20  Last Fill Quantity: 90,   # refills: 0  Last Office Visit: 4/10/20  Future Office visit:         Please approve if appropriate  Makenzie Og RN

## 2021-04-17 ENCOUNTER — HEALTH MAINTENANCE LETTER (OUTPATIENT)
Age: 36
End: 2021-04-17

## 2021-05-27 DIAGNOSIS — F41.0 PANIC ATTACK: ICD-10-CM

## 2021-06-02 NOTE — TELEPHONE ENCOUNTER
JS,  Left  #2 for patient  See below messages  No response from patient to our outreach  Please advise on further refill  Thanks,  Ibis KING RN

## 2021-10-02 ENCOUNTER — HEALTH MAINTENANCE LETTER (OUTPATIENT)
Age: 36
End: 2021-10-02

## 2022-05-14 ENCOUNTER — HEALTH MAINTENANCE LETTER (OUTPATIENT)
Age: 37
End: 2022-05-14

## 2022-09-03 ENCOUNTER — HEALTH MAINTENANCE LETTER (OUTPATIENT)
Age: 37
End: 2022-09-03

## 2023-06-02 ENCOUNTER — HEALTH MAINTENANCE LETTER (OUTPATIENT)
Age: 38
End: 2023-06-02

## 2023-10-26 NOTE — TELEPHONE ENCOUNTER
"Medication is being filled for 1 time refill only due to:  Patient needs to be seen because due for physical.   Ibis KING RN    Requested Prescriptions   Pending Prescriptions Disp Refills     traZODone (DESYREL) 50 MG tablet [Pharmacy Med Name: TRAZODONE 50MG TABLETS] 30 tablet 0     Sig: TAKE 1 TABLET BY MOUTH EVERY NIGHT AT BEDTIME AS NEEDED FOR SLEEP    Serotonin Modulators Passed - 12/10/2018 12:16 PM       Passed - Recent (12 mo) or future (30 days) visit within the authorizing provider's specialty    Patient had office visit in the last 12 months or has a visit in the next 30 days with authorizing provider or within the authorizing provider's specialty.  See \"Patient Info\" tab in inbasket, or \"Choose Columns\" in Meds & Orders section of the refill encounter.             Passed - Patient is age 18 or older            " normal S1, S2 heard

## (undated) RX ORDER — FENTANYL CITRATE 50 UG/ML
INJECTION, SOLUTION INTRAMUSCULAR; INTRAVENOUS
Status: DISPENSED
Start: 2018-07-18